# Patient Record
Sex: FEMALE | Race: WHITE | NOT HISPANIC OR LATINO | Employment: UNEMPLOYED | URBAN - METROPOLITAN AREA
[De-identification: names, ages, dates, MRNs, and addresses within clinical notes are randomized per-mention and may not be internally consistent; named-entity substitution may affect disease eponyms.]

---

## 2019-08-29 ENCOUNTER — CONSULT (OUTPATIENT)
Dept: SLEEP MEDICINE | Facility: HOSPITAL | Age: 24
End: 2019-08-29

## 2019-08-29 VITALS
BODY MASS INDEX: 20.88 KG/M2 | OXYGEN SATURATION: 96 % | HEART RATE: 70 BPM | SYSTOLIC BLOOD PRESSURE: 108 MMHG | WEIGHT: 103.6 LBS | HEIGHT: 59 IN | DIASTOLIC BLOOD PRESSURE: 63 MMHG

## 2019-08-29 DIAGNOSIS — G47.419 NARCOLEPSY WITHOUT CATAPLEXY: ICD-10-CM

## 2019-08-29 DIAGNOSIS — G47.63 SLEEP-RELATED BRUXISM: ICD-10-CM

## 2019-08-29 DIAGNOSIS — G47.10 HYPERSOMNIA: Primary | ICD-10-CM

## 2019-08-29 PROCEDURE — 99204 OFFICE O/P NEW MOD 45 MIN: CPT | Performed by: INTERNAL MEDICINE

## 2019-08-29 RX ORDER — ESCITALOPRAM OXALATE 10 MG/1
10 TABLET ORAL DAILY
Refills: 5 | COMMUNITY
Start: 2019-06-26 | End: 2021-05-21 | Stop reason: DRUGHIGH

## 2019-08-29 NOTE — PROGRESS NOTES
Elena Morales is a 23 y.o. female is being seen for consultation today at the request of Guicho Ram MD for the evaluation of excessive sleeping and daytime sleepiness.    History of Present Illness  Patient complains of always sleeping a lot.  She says she has to get at least 10 hours of sleep each night and feel rested.  She has been sleepy according to the patient and her mother since she was a child.  She was sleeping a lot before middle school.  She took naps after she got home from school.  She is also been noted to have significant bruxism and wears a bite guard all the time.  She apparently had a medical work-up that so far has been negative.  She says she is sleepy during the day.  She no longer naps regularly since she does work some of the time.  She denies any problems while driving.  She has not been noted to have snoring.  She has had the bruxism noted.    She denies awakening with a dry mouth or sore throat.  She denies waking gasping coughing choking.  She denies having trouble breathing through nose or breaking her nose.  She denies morning headaches.  She has a history of reflux but is not on medications.  She denies hypnagogic hallucinations or sleep paralysis.  She denies any family history of excessive sleepiness.  She denies any history of cataplexy.  She says she does have dreams during her naps.  She feels more refreshed after taking a nap.  Her brother apparently has a history of sleeping poorly at night.  She denies any history of kicking or jerking her legs.  She denies any history of chronic pain.  She states her weight is fairly stable.    She has a history of social anxiety disorder and is been on Lexapro.  She apparently was sleepy before she Lexapro was started.  She went off it for most of last summer and did not feel any less sleepy.    She arises about 11 AM and has breakfast gets cleaned up.  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  She has lunch about 1  PM.  She then works around the house or her job until dinnertime which is about 7 PM.  She then watches TV until she goes to bed about 11 PM.  Will take about half hour to go to sleep.  She awakens once during the night.  She thinks she gets 10 to 12 hours of sleep.  She says she feels better but sometimes is still somewhat tired.  She denies any history of hypertension diabetes coronary artery disease.  She does have history of anxiety.  She has environmental allergies to cats.      Current Outpatient Medications:   •  escitalopram (LEXAPRO) 10 MG tablet, Take 10 mg by mouth Daily., Disp: , Rfl: 5    Social History    Tobacco Use      Smoking status: Never Smoker      Smokeless tobacco: Never Used       Social History     Substance and Sexual Activity   Alcohol Use No   • Frequency: Never       Caffeine: 1 serving coffee and 1 serving of tea each day    Past Medical History:   Diagnosis Date   • Mental disorder     ANXIETY       Past Surgical History:   Procedure Laterality Date   • WISDOM TOOTH EXTRACTION  2015       Family History   Problem Relation Age of Onset   • Hypertension Mother    • Hypertension Maternal Grandmother    • Hypertension Maternal Grandfather        The following portions of the patient's history were reviewed and updated as appropriate: allergies, current medications, past family history, past medical history, past social history, past surgical history and problem list.    Review of Systems   Constitutional: Negative.    HENT: Negative.    Eyes: Negative.    Respiratory: Negative.    Cardiovascular: Negative.    Gastrointestinal:        She has frequent heartburn   Endocrine: Negative.    Genitourinary: Negative.    Musculoskeletal: Negative.    Skin: Negative.    Allergic/Immunologic: Positive for environmental allergies.   Neurological: Negative.    Hematological: Negative.    Psychiatric/Behavioral: The patient is nervous/anxious.    Jackson score is 3/24    Objective     /63    "Pulse 70   Ht 149.9 cm (59\")   Wt 47 kg (103 lb 9.6 oz)   SpO2 96%   BMI 20.92 kg/m²      Physical Exam   Constitutional: She is oriented to person, place, and time. She appears well-developed and well-nourished.   She is slender.   HENT:   Head: Normocephalic and atraumatic.   She has nasal airway narrowing Mallampati class I anatomy.   Eyes: EOM are normal. Pupils are equal, round, and reactive to light.   Neck: Normal range of motion. Neck supple.   Cardiovascular: Normal rate, regular rhythm and normal heart sounds.   Pulmonary/Chest: Effort normal and breath sounds normal.   Abdominal: Soft. Bowel sounds are normal.   Musculoskeletal: Normal range of motion. She exhibits no edema.   Neurological: She is alert and oriented to person, place, and time.   Skin: Skin is warm and dry.   Psychiatric: She has a normal mood and affect. Her behavior is normal.         Assessment/Plan   Lauren was seen today for sleeping problem.    Diagnoses and all orders for this visit:    Hypersomnia  -     Polysomnography 4 or more parameters; Future  -     Urine Drug Screen - Urine, Clean Catch; Future  -     Multiple sleep latency test without CPAP; Future    Sleep-related bruxism  -     Polysomnography 4 or more parameters; Future    Narcolepsy without cataplexy  -     Polysomnography 4 or more parameters; Future  -     Urine Drug Screen - Urine, Clean Catch; Future  -     Multiple sleep latency test without CPAP; Future    Patient presents with complaints of requiring excessive amounts of sleeping and then being sleepy during the day.  She has had sleepiness apparently since childhood.  She really does not give a history of much in the way of snoring.  She does have a history of bruxism that could be interrupting her sleep but that apparently is under better control with her oral appliance.  I think certainly narcolepsy would be a consideration.  I have discussed with the patient and her mother of the evaluation which would " include an overnight polysomnogram as well as multiple sleep latency test.  We have discussed possible therapies for narcolepsy.  They wish to work on securing insurance coverage before scheduling the studies.  I think that is reasonable.  The patient is not having sleep attacks or problems while driving.  We will plan to see her back after her study.  She is to practice good sleep hygiene.  She is to avoid alcohol and sedatives close to bedtime.  She is to maintain ideal body weight.         John John MD [facility]  Sleep Medicine  Pulmonary and Critical Care Medicine

## 2019-08-29 NOTE — PATIENT INSTRUCTIONS
Hypersomnia  Hypersomnia is a condition in which a person feels very tired during the day even though he or she gets plenty of sleep at night. A person with this condition may take naps during the day and may find it very difficult to wake up from sleep. Hypersomnia may affect a person's ability to think, concentrate, drive, or remember things.  What are the causes?  The cause of this condition may not be known. Possible causes include:  · Certain medicines.  · Sleep disorders, such as narcolepsy and sleep apnea.  · Injury to the head, brain, or spinal cord.  · Drug or alcohol use.  · Gastroesophageal reflux disease (GERD).  · Tumors.  · Certain medical conditions, such as depression, diabetes, or an underactive thyroid gland (hypothyroidism).  What are the signs or symptoms?  The main symptoms of hypersomnia include:  · Feeling very tired throughout the day, regardless of how much sleep you got the night before.  · Having trouble waking up. Others may find it difficult to wake you up when you are sleeping.  · Sleeping for longer and longer periods at a time.  · Taking naps throughout the day.  Other symptoms may include:  · Feeling restless, anxious, or annoyed.  · Lacking energy.  · Having trouble with:  ? Remembering.  ? Speaking.  ? Thinking.  · Loss of appetite.  · Seeing, hearing, tasting, smelling, or feeling things that are not real (hallucinations).  How is this diagnosed?  This condition may be diagnosed based on:  · Your symptoms and medical history.  · Your sleeping habits. Your health care provider may ask you to write down your sleeping habits in a daily sleep log, along with any symptoms you have.  · A series of tests that are done while you sleep (sleep study or polysomnogram).  · A test that measures how quickly you can fall asleep during the day (daytime nap study or multiple sleep latency test).  How is this treated?  Treatment can help you manage your condition. Treatment may  include:  · Following a regular sleep routine.  · Lifestyle changes, such as changing your eating habits, getting regular exercise, and avoiding alcohol or caffeinated beverages.  · Taking medicines to make you more alert (stimulants) during the day.  · Treating any underlying medical causes of hypersomnia.  Follow these instructions at home:  Sleep routine    · Schedule the same bedtime and wake-up time each day.  · Practice a relaxing bedtime routine. This may include reading, meditation, deep breathing, or taking a warm bath before going to sleep.  · Get regular exercise each day. Avoid strenuous exercise in the evening hours.  · Keep your sleep environment at a cooler temperature, darkened, and quiet.  · Sleep with pillows and a mattress that are comfortable and supportive.  · Schedule short 20-minute naps for when you feel sleepiest during the day.  · Talk with your employer or teachers about your hypersomnia. If possible, adjust your schedule so that:  ? You have a regular daytime work schedule.  ? You can take a scheduled nap during the day.  ? You do not have to work or be active at night.  · Do not eat a heavy meal for a few hours before bedtime. Eat your meals at about the same times every day.  · Avoid drinking alcohol or caffeinated beverages.  Safety    · Do not drive or use heavy machinery if you are sleepy. Ask your health care provider if it is safe for you to drive.  · Wear a life jacket when swimming or spending time near water.  General instructions  · Take supplements and over-the-counter and prescription medicines only as told by your health care provider.  · Keep a sleep log that will help your doctor manage your condition. This may include information about:  ? What time you go to bed each night.  ? How often you wake up at night.  ? How many hours you sleep at night.  ? How often and for how long you nap during the day.  ? Any observations from others, such as leg movements during sleep,  sleep walking, or snoring.  · Keep all follow-up visits as told by your health care provider. This is important.  Contact a health care provider if:  · You have new symptoms.  · Your symptoms get worse.  Get help right away if:  · You have serious thoughts about hurting yourself or someone else.  If you ever feel like you may hurt yourself or others, or have thoughts about taking your own life, get help right away. You can go to your nearest emergency department or call:  · Your local emergency services (911 in the [place]  · A suicide crisis helpline, such as the National Suicide Prevention Lifeline at 1-158.797.7438. This is open 24 hours a day.  Summary  · Hypersomnia refers to a condition in which you feel very tired during the day even though you get plenty of sleep at night.  · A person with this condition may take naps during the day and may find it very difficult to wake up from sleep.  · Hypersomnia may affect a person's ability to think, concentrate, drive, or remember things.  · Treatment, such as following a regular sleep routine and making some lifestyle changes, can help you manage your condition.  This information is not intended to replace advice given to you by your health care provider. Make sure you discuss any questions you have with your health care provider.  Document Released: 12/08/2003 Document Revised: 12/20/2018 Document Reviewed: 12/20/2018  [employer] Interactive Patient Education © 2019 [employer]  Narcolepsy  Narcolepsy is a neurological disorder that causes you to fall asleep suddenly, and without control, during the daytime (sleep attacks). Narcolepsy is a lifelong (chronic) disorder.  Normally, sleep follows a regular cycle over the course of the night. After about 90 minutes of light sleep, your sleep should become deeper. When your sleep becomes deeper, your body moves less and you start dreaming. This type of deep sleep is called rapid eye movement (REM) sleep. When you have  narcolepsy, your REM sleep is not well-regulated. This disrupts your sleep cycle, which causes daytime sleepiness.  What are the causes?  The cause of narcolepsy is not fully understood, but it may be related to:  · Low levels of hypocretin, a chemical (neurotransmitter) in the brain that controls sleep and wake cycles. Hypocretin imbalance may be caused by:  ? Abnormal genes that are passed from parent to child (inherited).  ? The body’s defense system (immune system) attacking hypocretin brain cells (autoimmune disease).  · Infection, tumor, or injury in the area of the brain that controls sleep.  · Exposure to poisons (toxins), such as heavy metals, pesticides, and secondhand smoke.  What are the signs or symptoms?  Symptoms of this condition include:  · Excessive daytime sleepiness. This is the most common symptom and is usually the first symptom you will notice. This may affect your performance at work or school.  · Sleep attacks. This means falling asleep suddenly and without control. You may fall asleep in the middle of an activity, especially low-energy activities like reading or watching TV.  · Feeling like you cannot think clearly.  · Trouble focusing or remembering things.  · Feeling depressed.  · Sudden muscle weakness (cataplexy). When this occurs, your speech may become slurred, or your knees may buckle. Cataplexy is usually triggered by surprise, anger, fear, or laughter.  · Loss of the ability to speak or move (sleep paralysis). This may occur just as you start to fall asleep or wake up. You will be aware of the paralysis. It usually lasts for just a few seconds or minutes.  · Seeing, hearing, tasting, smelling, or feeling things that are not real (hallucinations). Hallucinations may occur with sleep paralysis. They can happen when you are falling asleep, waking up, or dozing.  · Trouble staying asleep at night (insomnia).  · Restless sleep.  How is this diagnosed?  This condition may be diagnosed  based on:  · A physical exam to rule out any other problems that may be causing your symptoms. You may be asked to write down your sleeping patterns for several weeks in a sleep diary. This will help your health care provider make a diagnosis.  · Sleep studies that measure how well your REM sleep is regulated. These tests also measure your heart rate, breathing, movement, and brain waves. These tests include:  ? An overnight sleep study (polysomnogram).  ? A daytime sleep study that is done while you take several naps during the day (multiple sleep latency test, MSLT). This test measures how quickly you fall asleep and how quickly you enter REM sleep.  · Removal of spinal fluid to measure hypocretin levels.  How is this treated?  There is no cure for this condition, but treatment can help relieve symptoms. Treatment may include:  · Lifestyle and sleeping strategies to help you cope with the condition, such as:  ? Exercising regularly.  ? Maintaining a regular sleep schedule.  ? Avoiding caffeine and large meals before bed.  · Medicines. These may include:  ? Medicines that help keep you awake and alert (stimulants) to fight daytime sleepiness.  ? Medicines that treat depression (antidepressants). These may be used to treat cataplexy.  ? Sodium oxybate. This is a strong medicine to help you relax (sedative) that you may take at night. It can help control daytime sleepiness and cataplexy.  Follow these instructions at home:  Sleeping habits    · Get about 8 hours of sleep every night.  · Go to sleep and get up at about the same time every day.  · Keep your bedroom dark, quiet, and comfortable.  · When you feel very tired, take short naps. Schedule naps so that you take them at about the same time every day.  · Tell your employer or teachers that you have narcolepsy. You may be able to adjust your schedule to include time for naps.  · Before bedtime:  ? Avoid bright lights and screens.  ? Relax. Try activities like  reading or taking a warm bath.  Activity  · Get at least 20 minutes of exercise every day. This will help you sleep better at night and reduce daytime sleepiness.  · Avoid exercising within 3 hours of bedtime.  · If you are sleepy, do not drive or use heavy machinery.  · If possible, take a nap before driving.  · Do not swim or go out on the water without a life jacket.  Eating and drinking  · Do not drink alcohol or caffeinated beverages within 4-5 hours of bedtime.  · Do not eat a lot of food before bedtime. Eat meals at about the same times every day.  General instructions    · Take over-the-counter and prescription medicines only as told by your health care provider.  · If directed, keep a sleep diary.  · Tell your employer or teachers that you have narcolepsy. You may be able to adjust your schedule to include time for naps.  · Do not use any products that contain nicotine or tobacco, such as cigarettes and e-cigarettes. If you need help quitting, ask your health care provider.  · Keep all follow-up visits as told by your health care provider. This is important.  Contact a health care provider if:  · Your symptoms are not getting better.  · You have increasingly high blood pressure (hypertension).  · You have changes in your heart rhythm.  · You are having a hard time determining what is real and what is not (psychosis).  Get help right away if:  · You hurt yourself during a sleep attack or an attack of cataplexy.  · You have chest pain.  · You have trouble breathing.  This information is not intended to replace advice given to you by your health care provider. Make sure you discuss any questions you have with your health care provider.  Document Released: 12/08/2003 Document Revised: 12/11/2017 Document Reviewed: 12/11/2017  [employer] Interactive Patient Education © 2019 [employer]

## 2020-01-17 ENCOUNTER — HOSPITAL ENCOUNTER (OUTPATIENT)
Dept: SLEEP MEDICINE | Facility: HOSPITAL | Age: 25
Discharge: HOME | End: 2020-01-17
Admitting: INTERNAL MEDICINE

## 2020-01-17 VITALS
HEART RATE: 79 BPM | OXYGEN SATURATION: 94 % | SYSTOLIC BLOOD PRESSURE: 121 MMHG | DIASTOLIC BLOOD PRESSURE: 60 MMHG | WEIGHT: 105.16 LBS | HEIGHT: 60 IN | BODY MASS INDEX: 20.65 KG/M2

## 2020-01-17 DIAGNOSIS — G47.10 HYPERSOMNIA: ICD-10-CM

## 2020-01-17 DIAGNOSIS — G47.63 SLEEP-RELATED BRUXISM: ICD-10-CM

## 2020-01-17 DIAGNOSIS — G47.419 NARCOLEPSY WITHOUT CATAPLEXY: ICD-10-CM

## 2020-01-17 PROCEDURE — 95810 POLYSOM 6/> YRS 4/> PARAM: CPT | Performed by: INTERNAL MEDICINE

## 2020-01-17 PROCEDURE — 95810 POLYSOM 6/> YRS 4/> PARAM: CPT

## 2020-01-18 ENCOUNTER — APPOINTMENT (OUTPATIENT)
Dept: SLEEP MEDICINE | Facility: HOSPITAL | Age: 25
End: 2020-01-18

## 2020-01-20 ENCOUNTER — TELEPHONE (OUTPATIENT)
Dept: SLEEP MEDICINE | Facility: HOSPITAL | Age: 25
End: 2020-01-20

## 2020-01-20 NOTE — TELEPHONE ENCOUNTER
CALLED PATIENT TO SCHEDULE FU VISIT TO DISCUSS STUDY RESULTS. REACHED VM AND LEFT MESSAGE REQUESTING RETURN CALL

## 2020-01-21 ENCOUNTER — OFFICE VISIT (OUTPATIENT)
Dept: SLEEP MEDICINE | Facility: HOSPITAL | Age: 25
End: 2020-01-21

## 2020-01-21 VITALS
HEIGHT: 60 IN | DIASTOLIC BLOOD PRESSURE: 64 MMHG | BODY MASS INDEX: 20.81 KG/M2 | WEIGHT: 106 LBS | OXYGEN SATURATION: 98 % | SYSTOLIC BLOOD PRESSURE: 109 MMHG | HEART RATE: 91 BPM

## 2020-01-21 DIAGNOSIS — G47.63 SLEEP-RELATED BRUXISM: ICD-10-CM

## 2020-01-21 DIAGNOSIS — G47.33 OSA (OBSTRUCTIVE SLEEP APNEA): Primary | ICD-10-CM

## 2020-01-21 PROCEDURE — 99214 OFFICE O/P EST MOD 30 MIN: CPT | Performed by: INTERNAL MEDICINE

## 2020-01-21 NOTE — PROGRESS NOTES
"Elena Morales is a 24 y.o. female is here today for follow-up.  She is seen for follow-up of excessive daytime sleepiness and bruxism.  Her primary care physician is Dr. Ram.    History of Present Illness  Patient was last seen August 29, 2019.  She says she thinks she has been sleeping about the same since August.  She is very sleepy during the day.  Says she usually falls asleep fairly well and is sleeping 10 to 12 hours but is still very sleepy during the day.  She presented on January 17 for a polysomnogram.  [facility]  Past Medical History:   Diagnosis Date   • Mental disorder     ANXIETY       Past Surgical History:   Procedure Laterality Date   • WISDOM TOOTH EXTRACTION  2015           Current Outpatient Medications:   •  escitalopram (LEXAPRO) 10 MG tablet, Take 10 mg by mouth Daily., Disp: , Rfl: 5    Allergies   Allergen Reactions   • Cephalexin Swelling       The following portions of the patient's history were reviewed and updated as appropriate: allergies, current medications and problem list.    Review of Systems   Constitutional: Negative.    HENT: Negative.    Eyes: Negative.    Respiratory: Negative.    Cardiovascular: Negative.    Gastrointestinal: Negative.    Endocrine: Negative.    Genitourinary: Negative.    Musculoskeletal: Negative.    Skin: Negative.    Allergic/Immunologic: Negative.    Neurological: Negative.    Hematological: Negative.    Psychiatric/Behavioral: The patient is nervous/anxious.        Objective     /64   Pulse 91   Ht 151.1 cm (59.5\")   Wt 48.1 kg (106 lb)   SpO2 98%   BMI 21.05 kg/m²     Physical Exam   Constitutional: She is oriented to person, place, and time. She appears well-developed and well-nourished.   HENT:   Head: Normocephalic and atraumatic.   She has Mallampati class I anatomy.   Eyes: Pupils are equal, round, and reactive to light. EOM are normal.   Neck: Normal range of motion. Neck supple.   Cardiovascular: " Normal rate, regular rhythm and normal heart sounds.   Pulmonary/Chest: Effort normal and breath sounds normal.   Abdominal: Soft. Bowel sounds are normal.   Musculoskeletal: Normal range of motion. She exhibits no edema.   Neurological: She is alert and oriented to person, place, and time.   Skin: Skin is warm and dry.   Psychiatric: She has a normal mood and affect. Her behavior is normal.   Polysomnogram on January 17 showed a sleep efficiency of only 38.6%.  She spent only 5.7% of her total sleep time and REM sleep.  She had an AHI of 8.7 consistent with mild obstructive sleep apnea.  She did not have supine sleep.      Assessment/Plan   Lauren was seen today for follow-up.    Diagnoses and all orders for this visit:    YVON (obstructive sleep apnea)  -     Ambulatory Referral to Dentistry  -      Mandibular Advancement Device (custom fabricated)  -     Ambulatory Referral to ENT (Otolaryngology)    Sleep-related bruxism  -     Ambulatory Referral to Dentistry  -      Mandibular Advancement Device (custom fabricated)    Patient does have mild obstructive sleep apnea.  We have discussed possible therapies including CPAP, weight control, oral appliance, and surgery.  She thinks it she has some problems breathing through her nose at times and would like to have a referral to ENT.  She also however would like to consider an oral appliance.  We will order this as well.  She is to follow-up with her dentist.  We will plan to see her back in 6 months.  She is encouraged to maintain ideal body weight.  She is encouraged to avoid alcohol and sedatives close to bedtime.  She is encouraged to practice lateral position sleep.             John John MD [facility]  Sleep Medicine  Pulmonary and Critical Care Medicine      01/21/20  1:22 PM

## 2020-03-15 ENCOUNTER — OFFICE VISIT (OUTPATIENT)
Dept: RETAIL CLINIC | Facility: CLINIC | Age: 25
End: 2020-03-15

## 2020-03-15 VITALS
TEMPERATURE: 98.1 F | BODY MASS INDEX: 20.85 KG/M2 | HEART RATE: 85 BPM | WEIGHT: 105 LBS | RESPIRATION RATE: 20 BRPM | OXYGEN SATURATION: 99 %

## 2020-03-15 DIAGNOSIS — J03.90 TONSILLITIS: ICD-10-CM

## 2020-03-15 DIAGNOSIS — J02.9 ACUTE PHARYNGITIS, UNSPECIFIED ETIOLOGY: ICD-10-CM

## 2020-03-15 LAB
EXPIRATION DATE: NORMAL
INTERNAL CONTROL: NORMAL
Lab: NORMAL
S PYO AG THROAT QL: NEGATIVE

## 2020-03-15 PROCEDURE — 99213 OFFICE O/P EST LOW 20 MIN: CPT | Performed by: NURSE PRACTITIONER

## 2020-03-15 PROCEDURE — 87880 STREP A ASSAY W/OPTIC: CPT | Performed by: NURSE PRACTITIONER

## 2020-03-15 RX ORDER — AMOXICILLIN 875 MG/1
875 TABLET, COATED ORAL 2 TIMES DAILY
Qty: 20 TABLET | Refills: 0 | Status: SHIPPED | OUTPATIENT
Start: 2020-03-15 | End: 2020-03-25

## 2020-03-15 NOTE — PROGRESS NOTES
Sore Throat      Subjective   Lauren Morales is a 24 y.o. female.     Sore Throat    This is a new problem. The current episode started yesterday. The problem has been unchanged. Neither side of throat is experiencing more pain than the other. There has been no fever. The pain is moderate. Associated symptoms include swollen glands and trouble swallowing (pain). Pertinent negatives include no abdominal pain, congestion, coughing, diarrhea, drooling, ear discharge, ear pain, headaches, hoarse voice, plugged ear sensation, neck pain, shortness of breath, stridor or vomiting. She has had no exposure to strep or mono. She has tried gargles for the symptoms. The treatment provided no relief.        Patient Active Problem List   Diagnosis   • Hypersomnia   • Sleep-related bruxism   • YVON (obstructive sleep apnea)       Allergies   Allergen Reactions   • Cephalexin Swelling        Current Outpatient Medications on File Prior to Visit   Medication Sig Dispense Refill   • escitalopram (LEXAPRO) 10 MG tablet Take 10 mg by mouth Daily.  5     No current facility-administered medications on file prior to visit.        Past Medical History:   Diagnosis Date   • Mental disorder     ANXIETY       Past Surgical History:   Procedure Laterality Date   • WISDOM TOOTH EXTRACTION  2015       Family History   Problem Relation Age of Onset   • Hypertension Mother    • Hypertension Maternal Grandmother    • Hypertension Maternal Grandfather        Social History     Socioeconomic History   • Marital status: Single     Spouse name: Not on file   • Number of children: Not on file   • Years of education: Not on file   • Highest education level: Not on file   Tobacco Use   • Smoking status: Never Smoker   • Smokeless tobacco: Never Used   Substance and Sexual Activity   • Alcohol use: No     Frequency: Never   • Drug use: No   • Sexual activity: Defer       Travel:  No recent travel within the last 21 days outside the [place] Denies recent travel  to one of the following [place]  Countries:  [place], [place], [place], or [place].  Denies contact with anyone who has traveled to one of the following [place]  Countries: [place], [place], [place], or [place] within the last 21 days and is known or suspected to have Ebola.  Denies having had any contact with the human remains, blood or any bodily fluids of someone who is known or suspected to have Ebola within the last 21 days.     OB History    None         Review of Systems   Constitutional: Positive for appetite change. Negative for activity change, chills, fatigue and fever.   HENT: Positive for sore throat and trouble swallowing (pain). Negative for congestion, drooling, ear discharge, ear pain, hoarse voice, postnasal drip, rhinorrhea, sinus pressure, sinus pain, sneezing and voice change.    Respiratory: Negative for cough, chest tightness, shortness of breath and stridor.    Cardiovascular: Negative for chest pain.   Gastrointestinal: Negative for abdominal pain, diarrhea, nausea and vomiting.   Musculoskeletal: Negative for myalgias and neck pain.   Skin: Negative for rash.   Neurological: Negative for headaches.   All other systems reviewed and are negative.      Pulse 85   Temp 98.1 °F (36.7 °C) (Oral)   Resp 20   Wt 47.6 kg (105 lb)   LMP 03/01/2020 (Within Days)   SpO2 99%   Breastfeeding No   BMI 20.85 kg/m²     Objective   Physical Exam   Constitutional: She is oriented to person, place, and time. She appears well-developed and well-nourished. No distress.   HENT:   Head: Normocephalic and atraumatic.   Right Ear: Hearing, external ear and ear canal normal. Tympanic membrane is injected.   Left Ear: Hearing, external ear and ear canal normal. Tympanic membrane is injected.   Nose: Nose normal. No mucosal edema or rhinorrhea.   Mouth/Throat: Uvula is midline and mucous membranes are normal. Posterior oropharyngeal erythema present. No oropharyngeal exudate, posterior oropharyngeal  edema or tonsillar abscesses. Tonsils are 2+ on the right. Tonsils are 2+ on the left. Tonsillar exudate.   Eyes: Pupils are equal, round, and reactive to light. Conjunctivae and EOM are normal. Right eye exhibits no discharge. Left eye exhibits no discharge. No scleral icterus.   Neck: Normal range of motion. Neck supple.   Cardiovascular: Normal rate, regular rhythm and normal heart sounds.   Pulmonary/Chest: Effort normal and breath sounds normal. No respiratory distress. She has no wheezes. She has no rales.   Musculoskeletal: Normal range of motion.   Lymphadenopathy:     She has cervical adenopathy.   Neurological: She is alert and oriented to person, place, and time.   Skin: Skin is warm and dry. No rash noted. She is not diaphoretic.   Psychiatric: She has a normal mood and affect. Her behavior is normal.   Vitals reviewed.      Assessment/Plan   Lauren was seen today for sore throat.    Diagnoses and all orders for this visit:    Tonsillitis  -     amoxicillin (AMOXIL) 875 MG tablet; Take 1 tablet by mouth 2 (Two) Times a Day for 10 days.    Acute pharyngitis, unspecified etiology  -     POC Rapid Strep A    Plan of care discussed: rest, increase fluids, alternate Tylenol and Motrin for pain; warm salt water gargles; complete antibiotic entirely; change toothbrush in 48 hours. Follow up with PCP for no improvement in 2-3 days or sooner for new or worsening symptoms.     Results for orders placed or performed in visit on 03/15/20   POC Rapid Strep A   Result Value Ref Range    Rapid Strep A Screen Negative Negative, VALID, INVALID, Not Performed    Internal Control Passed Passed    Lot Number [id number]     Expiration Date 04/30/21        Return if symptoms worsen or fail to improve.

## 2020-03-15 NOTE — PATIENT INSTRUCTIONS
Tonsillitis    Tonsillitis is an infection of the throat. This infection causes the tonsils to become red, tender, and swollen. Tonsils are tissues in the back of your throat. If bacteria caused your infection, antibiotic medicine will be given to you. Sometimes, symptoms of this infection can be treated with the use of medicines that lessen swelling (steroids). If your tonsillitis is very bad (severe) and happens often, you may need to get your tonsils removed (tonsillectomy).  Follow these instructions at home:  Medicines  · Take over-the-counter and prescription medicines only as told by your doctor.  · If you were prescribed an antibiotic, take it as told by your doctor. Do not stop taking the antibiotic even if you start to feel better.  Eating and drinking  · Drink enough fluid to keep your pee (urine) clear or pale yellow.  · While your throat is sore, eat soft or liquid foods like:  ? Soup.  ? Sherbert.  ? Instant breakfast drinks.  · Drink warm fluids.  · Eat frozen ice pops.  General instructions  · Rest as much as possible and get plenty of sleep.  · Gargle with a salt-water mixture 3-4 times a day or as needed. To make a salt-water mixture, completely dissolve ½-1 tsp of salt in 1 cup of warm water.  · Wash your hands often with soap and water. If there is no soap and water, use hand .  · Do not share cups, bottles, or other utensils until your symptoms are gone.  · Do not smoke. If you need help quitting, ask your doctor.  · Keep all follow-up visits as told by your doctor. This is important.  Contact a doctor if:  · You have large, tender lumps in your neck.  · You have a fever that does not go away after 2-3 days.  · You have a rash.  · You cough up green, yellow-brown, or bloody fluid.  · You cannot swallow liquids or food for 24 hours.  · Only one of your tonsils is swollen.  Get help right away if:  · You have any new symptoms such as:  ? Vomiting.  ? Very bad headache.  ? Stiff  neck.  ? Chest pain.  ? Trouble breathing or swallowing.  · You have very bad throat pain and you also have drooling or voice changes.  · You have very bad pain that is not helped by medicine.  · You cannot fully open your mouth.  · You have redness, swelling, or severe pain anywhere in your neck.  Summary  · Tonsillitis causes your tonsils to be red, tender, and swollen.  · While your throat is sore, eat soft or liquid foods.  · Gargle with a salt-water mixture 3-4 times a day or as needed.  · Do not share cups, bottles, or other utensils until your symptoms are gone.  This information is not intended to replace advice given to you by your health care provider. Make sure you discuss any questions you have with your health care provider.  Document Released: 06/05/2009 Document Revised: 01/23/2018 Document Reviewed: 01/23/2018  [employer] Interactive Patient Education © 2020 [employer]

## 2020-06-09 ENCOUNTER — APPOINTMENT (OUTPATIENT)
Dept: PREADMISSION TESTING | Facility: HOSPITAL | Age: 25
End: 2020-06-09

## 2020-06-09 LAB
REF LAB TEST METHOD: NORMAL
SARS-COV-2 RNA RESP QL NAA+PROBE: NOT DETECTED

## 2020-06-09 PROCEDURE — U0002 COVID-19 LAB TEST NON-CDC: HCPCS

## 2020-06-09 PROCEDURE — C9803 HOPD COVID-19 SPEC COLLECT: HCPCS

## 2020-06-09 PROCEDURE — U0004 COV-19 TEST NON-CDC HGH THRU: HCPCS

## 2020-07-15 ENCOUNTER — APPOINTMENT (OUTPATIENT)
Dept: PREADMISSION TESTING | Facility: HOSPITAL | Age: 25
End: 2020-07-15

## 2020-07-15 LAB — SARS-COV-2 RNA RESP QL NAA+PROBE: NOT DETECTED

## 2020-07-15 PROCEDURE — C9803 HOPD COVID-19 SPEC COLLECT: HCPCS

## 2020-07-15 PROCEDURE — 87635 SARS-COV-2 COVID-19 AMP PRB: CPT | Performed by: OTOLARYNGOLOGY

## 2020-07-16 ENCOUNTER — LAB REQUISITION (OUTPATIENT)
Dept: LAB | Facility: HOSPITAL | Age: 25
End: 2020-07-16

## 2020-07-16 DIAGNOSIS — J35.1 HYPERTROPHY OF TONSILS: ICD-10-CM

## 2020-07-16 PROCEDURE — 88304 TISSUE EXAM BY PATHOLOGIST: CPT | Performed by: OTOLARYNGOLOGY

## 2020-07-17 LAB
CYTO UR: NORMAL
LAB AP CASE REPORT: NORMAL
LAB AP CLINICAL INFORMATION: NORMAL
PATH REPORT.FINAL DX SPEC: NORMAL
PATH REPORT.GROSS SPEC: NORMAL

## 2021-04-28 ENCOUNTER — TELEPHONE (OUTPATIENT)
Dept: SLEEP MEDICINE | Facility: HOSPITAL | Age: 26
End: 2021-04-28

## 2021-04-28 NOTE — TELEPHONE ENCOUNTER
Patient called says tired the sleep guard machine she said it holds your jaw forward but it din;t work pt requesting cpap prescription

## 2021-05-21 ENCOUNTER — OFFICE VISIT (OUTPATIENT)
Dept: SLEEP MEDICINE | Facility: HOSPITAL | Age: 26
End: 2021-05-21

## 2021-05-21 VITALS
HEART RATE: 68 BPM | DIASTOLIC BLOOD PRESSURE: 54 MMHG | HEIGHT: 64 IN | WEIGHT: 115.4 LBS | OXYGEN SATURATION: 95 % | BODY MASS INDEX: 19.7 KG/M2 | SYSTOLIC BLOOD PRESSURE: 90 MMHG

## 2021-05-21 DIAGNOSIS — G47.33 OBSTRUCTIVE SLEEP APNEA, ADULT: ICD-10-CM

## 2021-05-21 DIAGNOSIS — R06.83 SNORING: Primary | ICD-10-CM

## 2021-05-21 PROCEDURE — 99213 OFFICE O/P EST LOW 20 MIN: CPT | Performed by: INTERNAL MEDICINE

## 2021-05-21 RX ORDER — MINOCYCLINE HYDROCHLORIDE 100 MG/1
CAPSULE ORAL
COMMUNITY
End: 2022-11-17

## 2021-05-21 RX ORDER — CLINDAMYCIN AND BENZOYL PEROXIDE 10; 50 MG/G; MG/G
GEL TOPICAL
COMMUNITY
End: 2022-11-17

## 2021-05-21 RX ORDER — ESCITALOPRAM OXALATE 20 MG/1
TABLET ORAL
COMMUNITY
End: 2022-11-17

## 2021-05-21 RX ORDER — TRETINOIN 0.5 MG/G
CREAM TOPICAL
COMMUNITY
Start: 2020-06-03 | End: 2023-01-30 | Stop reason: SDUPTHER

## 2021-05-25 NOTE — PROGRESS NOTES
"Elena Morales is a 25 y.o. female is here today for follow-up.  She is seen for follow-up of obstructive sleep apnea.  Her primary care physician is Dr. Ram.  She is seen in person in the sleep clinic.    History of Present Illness  Patient was last seen in this clinic on January 21, 2020.  She had mild obstructive sleep apnea with an AHI of 8.7 on her previous study.  She was referred to get an oral appliance.  She was seen by ENT and had her tonsils removed and also got an oral appliance but she is still having nonrestful sleep and \"heavy breathing\" noted.  She wishes to consider CPAP.  She does not feel rested on arising in the morning.  She has a morning headache 3 days/week.  Past Medical History:   Diagnosis Date   • Mental disorder     ANXIETY       Past Surgical History:   Procedure Laterality Date   • WISDOM TOOTH EXTRACTION  2015           Current Outpatient Medications:   •  tretinoin (RETIN-A) 0.05 % cream, tretinoin 0.05 % topical cream  APPLY TO THE AFFECTED AREA(S) BY TOPICAL ROUTE ONCE DAILY AT BEDTIME, Disp: , Rfl:   •  clindamycin-benzoyl peroxide (BENZACLIN) 1-5 % gel, clindamycin 1 %-benzoyl peroxide 5 % topical gel  APPLY   TOPICALLY ONCE DAILY, Disp: , Rfl:   •  escitalopram (LEXAPRO) 20 MG tablet, escitalopram 20 mg tablet  Take 1 tablet every day by oral route for 90 days., Disp: , Rfl:   •  minocycline (MINOCIN,DYNACIN) 100 MG capsule, minocycline 100 mg capsule  take 1 capsule BID x 7 days then 1 capsule daily x 3 weeks, Disp: , Rfl:   •  sulfacetamide sodium-sulfur (AVAR,PLEXION) 10-5 % topical emulsion, sulfacetamide sodium-sulfur 10 %-5 % (w/w) topical cleanser  WASH THE AFFECTED AREA(S) BY TOPICAL ROUTE ONCE DAILY, Disp: , Rfl:     Allergies   Allergen Reactions   • Cephalexin Swelling       The following portions of the patient's history were reviewed and updated as appropriate: allergies, current medications and problem list.    Review of Systems   Constitutional: " "Negative.    HENT: Negative.    Eyes: Negative.    Respiratory: Negative.    Cardiovascular: Negative.    Gastrointestinal: Negative.    Endocrine: Negative.    Genitourinary: Negative.    Musculoskeletal: Negative.    Skin: Negative.    Allergic/Immunologic: Negative.    Neurological: Negative.    Hematological: Negative.    Psychiatric/Behavioral: The patient is nervous/anxious.    Superior score is 8/24    Objective     BP 90/54 (BP Location: Left arm, Patient Position: Sitting, Cuff Size: Adult)   Pulse 68   Ht 162.6 cm (64\")   Wt 52.3 kg (115 lb 6.4 oz)   SpO2 95%   BMI 19.81 kg/m²     Physical Exam  Patient appears to be awake and alert.  She does not appear to be in acute respiratory distress.    She is normocephalic.  She has Mallampati class II anatomy    Lungs are clear    Cardiac exam revealed normal S1 and S2.  Extremities showed no edema.    Previous home sleep test showed an AHI of 8.7.    Assessment/Plan   Diagnoses and all orders for this visit:    1. Snoring (Primary)  -     Detailed AutoPAP Order    2. Obstructive sleep apnea, adult  -     Detailed AutoPAP Order    Patient has a history of mild obstructive sleep apnea.  She has had surgery but still has apparently mild snoring and morning headaches and is not rested.  She is interested in getting CPAP.  We will place orders for that.  We will order an AutoSet device.  We will have her return in 2 months and we can then download her machine and see if it is correcting her events.  She is encouraged to maintain ideal body weight.  She is encouraged to avoid alcohol and sedatives close to bedtime.  She is encouraged to practice lateral position sleep.  We will reassess her on her return.    Total time: 20 minutes exclusive of procedures.             John John MD [facility]  Sleep Medicine  Pulmonary and Critical Care Medicine      05/25/21  05:57 EDT  "

## 2022-11-17 ENCOUNTER — LAB (OUTPATIENT)
Dept: LAB | Facility: HOSPITAL | Age: 27
End: 2022-11-17

## 2022-11-17 ENCOUNTER — PATIENT ROUNDING (BHMG ONLY) (OUTPATIENT)
Dept: FAMILY MEDICINE CLINIC | Facility: CLINIC | Age: 27
End: 2022-11-17

## 2022-11-17 ENCOUNTER — OFFICE VISIT (OUTPATIENT)
Dept: FAMILY MEDICINE CLINIC | Facility: CLINIC | Age: 27
End: 2022-11-17

## 2022-11-17 VITALS
HEIGHT: 64 IN | HEART RATE: 88 BPM | BODY MASS INDEX: 19.39 KG/M2 | OXYGEN SATURATION: 99 % | WEIGHT: 113.6 LBS | DIASTOLIC BLOOD PRESSURE: 80 MMHG | SYSTOLIC BLOOD PRESSURE: 112 MMHG

## 2022-11-17 DIAGNOSIS — R53.82 CHRONIC FATIGUE: Primary | ICD-10-CM

## 2022-11-17 DIAGNOSIS — R14.0 BLOATING: ICD-10-CM

## 2022-11-17 DIAGNOSIS — Z00.00 ENCOUNTER FOR MEDICAL EXAMINATION TO ESTABLISH CARE: ICD-10-CM

## 2022-11-17 DIAGNOSIS — F33.0 MILD EPISODE OF RECURRENT MAJOR DEPRESSIVE DISORDER: ICD-10-CM

## 2022-11-17 DIAGNOSIS — J30.2 SEASONAL ALLERGIES: ICD-10-CM

## 2022-11-17 DIAGNOSIS — L70.0 ACNE VULGARIS: ICD-10-CM

## 2022-11-17 PROBLEM — L70.9 ACNE: Status: ACTIVE | Noted: 2018-05-17

## 2022-11-17 PROBLEM — F41.9 ANXIETY: Status: ACTIVE | Noted: 2017-05-08

## 2022-11-17 LAB
25(OH)D3 SERPL-MCNC: 51.5 NG/ML (ref 30–100)
ALBUMIN SERPL-MCNC: 4.6 G/DL (ref 3.5–5.2)
ALBUMIN/GLOB SERPL: 2 G/DL
ALP SERPL-CCNC: 42 U/L (ref 39–117)
ALT SERPL W P-5'-P-CCNC: 19 U/L (ref 1–33)
ANION GAP SERPL CALCULATED.3IONS-SCNC: 9.4 MMOL/L (ref 5–15)
AST SERPL-CCNC: 27 U/L (ref 1–32)
BILIRUB SERPL-MCNC: 0.3 MG/DL (ref 0–1.2)
BUN SERPL-MCNC: 8 MG/DL (ref 6–20)
BUN/CREAT SERPL: 13.8 (ref 7–25)
CALCIUM SPEC-SCNC: 9.6 MG/DL (ref 8.6–10.5)
CHLORIDE SERPL-SCNC: 106 MMOL/L (ref 98–107)
CO2 SERPL-SCNC: 25.6 MMOL/L (ref 22–29)
CREAT SERPL-MCNC: 0.58 MG/DL (ref 0.57–1)
DEPRECATED RDW RBC AUTO: 40.1 FL (ref 37–54)
EGFRCR SERPLBLD CKD-EPI 2021: 128.2 ML/MIN/1.73
ERYTHROCYTE [DISTWIDTH] IN BLOOD BY AUTOMATED COUNT: 11.6 % (ref 12.3–15.4)
GLOBULIN UR ELPH-MCNC: 2.3 GM/DL
GLUCOSE SERPL-MCNC: 68 MG/DL (ref 65–99)
HCT VFR BLD AUTO: 42.5 % (ref 34–46.6)
HGB BLD-MCNC: 14.1 G/DL (ref 12–15.9)
MCH RBC QN AUTO: 31.5 PG (ref 26.6–33)
MCHC RBC AUTO-ENTMCNC: 33.2 G/DL (ref 31.5–35.7)
MCV RBC AUTO: 95.1 FL (ref 79–97)
PLATELET # BLD AUTO: 199 10*3/MM3 (ref 140–450)
PMV BLD AUTO: 11.2 FL (ref 6–12)
POTASSIUM SERPL-SCNC: 3.5 MMOL/L (ref 3.5–5.2)
PROT SERPL-MCNC: 6.9 G/DL (ref 6–8.5)
RBC # BLD AUTO: 4.47 10*6/MM3 (ref 3.77–5.28)
SODIUM SERPL-SCNC: 141 MMOL/L (ref 136–145)
TSH SERPL DL<=0.05 MIU/L-ACNC: 1.7 UIU/ML (ref 0.27–4.2)
VIT B12 BLD-MCNC: 824 PG/ML (ref 211–946)
WBC NRBC COR # BLD: 4.98 10*3/MM3 (ref 3.4–10.8)

## 2022-11-17 PROCEDURE — 99204 OFFICE O/P NEW MOD 45 MIN: CPT | Performed by: STUDENT IN AN ORGANIZED HEALTH CARE EDUCATION/TRAINING PROGRAM

## 2022-11-17 PROCEDURE — 86003 ALLG SPEC IGE CRUDE XTRC EA: CPT | Performed by: STUDENT IN AN ORGANIZED HEALTH CARE EDUCATION/TRAINING PROGRAM

## 2022-11-17 PROCEDURE — 82306 VITAMIN D 25 HYDROXY: CPT | Performed by: STUDENT IN AN ORGANIZED HEALTH CARE EDUCATION/TRAINING PROGRAM

## 2022-11-17 PROCEDURE — 84443 ASSAY THYROID STIM HORMONE: CPT | Performed by: STUDENT IN AN ORGANIZED HEALTH CARE EDUCATION/TRAINING PROGRAM

## 2022-11-17 PROCEDURE — 82607 VITAMIN B-12: CPT | Performed by: STUDENT IN AN ORGANIZED HEALTH CARE EDUCATION/TRAINING PROGRAM

## 2022-11-17 PROCEDURE — 86364 TISS TRNSGLTMNASE EA IG CLAS: CPT | Performed by: STUDENT IN AN ORGANIZED HEALTH CARE EDUCATION/TRAINING PROGRAM

## 2022-11-17 PROCEDURE — 86231 EMA EACH IG CLASS: CPT | Performed by: STUDENT IN AN ORGANIZED HEALTH CARE EDUCATION/TRAINING PROGRAM

## 2022-11-17 PROCEDURE — 82784 ASSAY IGA/IGD/IGG/IGM EACH: CPT | Performed by: STUDENT IN AN ORGANIZED HEALTH CARE EDUCATION/TRAINING PROGRAM

## 2022-11-17 PROCEDURE — 85027 COMPLETE CBC AUTOMATED: CPT | Performed by: STUDENT IN AN ORGANIZED HEALTH CARE EDUCATION/TRAINING PROGRAM

## 2022-11-17 PROCEDURE — 80053 COMPREHEN METABOLIC PANEL: CPT | Performed by: STUDENT IN AN ORGANIZED HEALTH CARE EDUCATION/TRAINING PROGRAM

## 2022-11-17 RX ORDER — BUPROPION HYDROCHLORIDE 150 MG/1
150 TABLET ORAL DAILY
Qty: 30 TABLET | Refills: 2 | OUTPATIENT
Start: 2022-11-17 | End: 2023-01-19

## 2022-11-17 RX ORDER — BUPROPION HYDROCHLORIDE 150 MG/1
150 TABLET ORAL DAILY
COMMUNITY
End: 2022-11-17 | Stop reason: SDUPTHER

## 2022-11-17 NOTE — PROGRESS NOTES
"  New Patient Office Visit      Patient Name: Lauren REECE  : 1995   MRN: [id number]   Care Team: Patient Care Team:  Lizbet Morgan DO as PCP - General (Internal Medicine)    Chief Complaint:    Chief Complaint   Patient presents with   • new patient preventative medicine service       History of Present Illness: Lauren REECE is a 27 y.o. female with anxiety, depression, mild sleep apnea who is here today to establish care. Previously following with Dr. Ram for PCP needs. She is  and works in .     Reports chronic fatigue ongoing for several years. She follows with sleep medicine for mild sleep apnea and snoring. She did not find that CPAP was helpful. She has had labs done in the past and reports unrevealing results.     Depression - she recently started wellbutrin about 2 weeks ago through online system \"[employer]\". She has been tolerating this well so far. Denies side effects such as irritability, anxiety, insomnia, appetite suppressant. She was previously on lexapro for several years and recently weaned off due to feeling it was ineffective.     She has followed with Dermatology in the past for her acne. Using sulfur based faced wash, retin-A, and topical dapsone. Previously taking spironolactone but no longer feels she needs it.    She reports concerns for seasonal allergies - cough, runny nose, sneezing. She is also concerned with food allergies affecting her skin (acne) and causing bloating. Artificial sugar causes GI discomfort and break outs. Denies changes in bowel habits, diarrhea, constipation, hematochezia.. Denies rashes. She requests referral to allergist.      Subjective      Review of Systems:   Review of Systems - See HPI    Past Medical History:   Past Medical History:   Diagnosis Date   • Anxiety    • Depression    • GERD (gastroesophageal reflux disease)    • Mental disorder     ANXIETY       Past Surgical History:   Past Surgical History:   Procedure Laterality Date " "  • TONSILLECTOMY  2020   • WISDOM TOOTH EXTRACTION  2015       Family History:   Family History   Problem Relation Age of Onset   • Hypertension Mother    • Hypertension Maternal Grandmother    • Hypertension Maternal Grandfather        Social History:   Social History     Socioeconomic History   • Marital status:    Tobacco Use   • Smoking status: Never   • Smokeless tobacco: Never   Vaping Use   • Vaping Use: Never used   Substance and Sexual Activity   • Alcohol use: No   • Drug use: No   • Sexual activity: Defer       Tobacco History:   Social History     Tobacco Use   Smoking Status Never   Smokeless Tobacco Never       Medications:     Current Outpatient Medications:   •  buPROPion XL (WELLBUTRIN XL) 150 MG 24 hr tablet, Take 1 tablet by mouth Daily., Disp: 30 tablet, Rfl: 2  •  sulfacetamide sodium-sulfur (AVAR,PLEXION) 10-5 % topical emulsion, sulfacetamide sodium-sulfur 10 %-5 % (w/w) topical cleanser  WASH THE AFFECTED AREA(S) BY TOPICAL ROUTE ONCE DAILY, Disp: , Rfl:   •  tretinoin (RETIN-A) 0.05 % cream, tretinoin 0.05 % topical cream  APPLY TO THE AFFECTED AREA(S) BY TOPICAL ROUTE ONCE DAILY AT BEDTIME, Disp: , Rfl:     Allergies:   Allergies   Allergen Reactions   • Cephalexin Swelling       Objective     Physical Exam:  Vital Signs:   Vitals:    11/17/22 1010   BP: 112/80   Pulse: 88   SpO2: 99%   Weight: 51.5 kg (113 lb 9.6 oz)   Height: 162.6 cm (64\")     Body mass index is 19.5 kg/m².     Physical Exam  Vitals reviewed.   Constitutional:       Appearance: Normal appearance. She is not ill-appearing.   Cardiovascular:      Rate and Rhythm: Normal rate and regular rhythm.      Pulses: Normal pulses.      Heart sounds: Normal heart sounds.   Pulmonary:      Effort: Pulmonary effort is normal. No respiratory distress.   Abdominal:      General: Abdomen is flat. There is no distension.      Palpations: Abdomen is soft.      Tenderness: There is no abdominal tenderness. There is no guarding or " rebound.   Skin:     General: Skin is warm and dry.   Neurological:      Mental Status: She is alert.   Psychiatric:         Mood and Affect: Mood normal.         Behavior: Behavior normal.         Judgment: Judgment normal.         Assessment / Plan      Assessment/Plan:   Problems Addressed This Visit  Diagnoses and all orders for this visit:    1. Chronic fatigue (Primary)  -     CBC (No Diff); Future  -     Comprehensive Metabolic Panel; Future  -     TSH; Future  -     Vitamin B12; Future  -     Vitamin D,25-Hydroxy; Future    Will obtain labs today to evaluate for underlying metabolic dysfunction or deficiency. Discussed importance of healthy sleep hygiene habits.   Will treat underlying depression. Wellbutrin is a good option as this may help with overall energy.    2. Mild episode of recurrent major depressive disorder (HCC)  -     buPROPion XL (WELLBUTRIN XL) 150 MG 24 hr tablet; Take 1 tablet by mouth Daily.  Dispense: 30 tablet; Refill: 2  -     CBC (No Diff); Future  -     Comprehensive Metabolic Panel; Future  -     TSH; Future  -     Vitamin B12; Future  -     Vitamin D,25-Hydroxy; Future    Continue wellbutrin 150mg daily without changes. She has only been taking this for 2 weeks and tolerating well thus far. Will follow up in 4 weeks to reassess dose/effectiveness. We discussed possible side effects and she will let me know if she experiences any of these.    3. Encounter for medical examination to establish care  Discussed importance of preventative care including vaccinations, age appropriate cancer screening, routine lab work, healthy diet, and active lifestyle.  Will be due for an annual once mood has stabilized.    4. Seasonal allergies  -     Ambulatory Referral to Allergy    5. Acne vulgaris  Previously following with dermatology, feels symptoms are well controlled now with topical therapy. Continue without changes.     6. Bloating  -     Food Allergy Profile; Future  -     Ambulatory Referral  to Allergy  -     Comprehensive Metabolic Panel; Future  -     Celiac Disease Panel; Future    She expresses concerns for food allergies causing break out and bloating. Discussed limitation to food allergy testing. Will obtain food allergy profile, celiac screening and referred to allergy per her request. Recommended low FODMAPs diet which may help with bloating/overall inflammation. Avoid known triggers such as artificial sweeteners.       Plan of care reviewed with patient at the conclusion of today's visit. Education was provided regarding diagnosis and management.  Patient verbalizes understanding of and agreement with management plan.      Follow Up:   Return in about 4 weeks (around 12/15/2022) for Recheck mood.          DO KENIA Bashir RD  [facility]  [address]  [facility] [address]  Fax [phone]  Phone [phone]

## 2022-11-21 LAB
ENDOMYSIUM IGA SER QL: NEGATIVE
IGA SERPL-MCNC: 73 MG/DL (ref 87–352)
TTG IGA SER-ACNC: <2 U/ML (ref 0–3)
TTG IGG SER-ACNC: <2 U/ML (ref 0–5)

## 2022-11-24 LAB
CLAM IGE QN: <0.1 KU/L
CODFISH IGE QN: <0.1 KU/L
CONV CLASS DESCRIPTION: ABNORMAL
CORN IGE QN: <0.1 KU/L
COW MILK IGE QN: <0.1 KU/L
EGG WHITE IGE QN: <0.1 KU/L
PEANUT IGE QN: <0.1 KU/L
SCALLOP IGE QN: <0.1 KU/L
SESAME SEED IGE QN: 0.13 KU/L
SHRIMP IGE QN: <0.1 KU/L
SOYBEAN IGE QN: <0.1 KU/L
WALNUT IGE QN: <0.1 KU/L
WHEAT IGE QN: <0.1 KU/L

## 2023-01-11 ENCOUNTER — TELEPHONE (OUTPATIENT)
Dept: FAMILY MEDICINE CLINIC | Facility: CLINIC | Age: 28
End: 2023-01-11
Payer: OTHER GOVERNMENT

## 2023-01-11 DIAGNOSIS — L70.0 ACNE VULGARIS: Primary | ICD-10-CM

## 2023-01-11 RX ORDER — SULFACETAMIDE SODIUM AND SULFUR 10; 5 MG/G; MG/G
1 RINSE TOPICAL DAILY
Qty: 227 G | Refills: 1 | Status: SHIPPED | OUTPATIENT
Start: 2023-01-11

## 2023-01-11 NOTE — TELEPHONE ENCOUNTER
Caller: Lauren REECE    Relationship: Self    Best call back number: [phone]    Requested Prescriptions:   sulfacetamide sodium-sulfur (AVAR,PLEXION) 10-5 % topical emulsion    Pharmacy where request should be sent: [facility] [id number] - [phone]  - [phone]     Additional details provided by patient:     Does the patient have less than a 3 day supply:  [x] Yes  [] No    Would you like a call back once the refill request has been completed: [x] Yes [] No    If the office needs to give you a call back, can they leave a voicemail: [] Yes [x] No    Jude Tony, PCT   01/11/23 10:29 EST

## 2023-01-11 NOTE — TELEPHONE ENCOUNTER
Contacted pt since we have never prescribed. Pt states she uses this face wash daily and it was prescribed by her previous PCP

## 2023-01-12 NOTE — TELEPHONE ENCOUNTER
Attempted to contact patient, no answer. Left voicemail detailed voicemail informing pt that script has been sent to the pharmacy & if she had additional questions to call the office back.

## 2023-01-30 ENCOUNTER — OFFICE VISIT (OUTPATIENT)
Dept: FAMILY MEDICINE CLINIC | Facility: CLINIC | Age: 28
End: 2023-01-30
Payer: OTHER GOVERNMENT

## 2023-01-30 ENCOUNTER — TELEPHONE (OUTPATIENT)
Dept: FAMILY MEDICINE CLINIC | Facility: CLINIC | Age: 28
End: 2023-01-30

## 2023-01-30 VITALS
OXYGEN SATURATION: 99 % | HEIGHT: 60 IN | HEART RATE: 70 BPM | WEIGHT: 112.6 LBS | SYSTOLIC BLOOD PRESSURE: 100 MMHG | BODY MASS INDEX: 22.1 KG/M2 | DIASTOLIC BLOOD PRESSURE: 66 MMHG

## 2023-01-30 DIAGNOSIS — F33.0 MILD EPISODE OF RECURRENT MAJOR DEPRESSIVE DISORDER: Primary | ICD-10-CM

## 2023-01-30 DIAGNOSIS — Z30.09 ENCOUNTER FOR OTHER GENERAL COUNSELING OR ADVICE ON CONTRACEPTION: ICD-10-CM

## 2023-01-30 DIAGNOSIS — L70.0 ACNE VULGARIS: ICD-10-CM

## 2023-01-30 PROCEDURE — 99214 OFFICE O/P EST MOD 30 MIN: CPT | Performed by: STUDENT IN AN ORGANIZED HEALTH CARE EDUCATION/TRAINING PROGRAM

## 2023-01-30 RX ORDER — TRETINOIN 0.5 MG/G
CREAM TOPICAL NIGHTLY
Qty: 45 G | Refills: 0 | Status: SHIPPED | OUTPATIENT
Start: 2023-01-30

## 2023-01-30 NOTE — TELEPHONE ENCOUNTER
Caller: Lauren REECE    Relationship: Self    Best call back number:     What is the medical concern/diagnosis: MENTAL HEALTH    What specialty or service is being requested: MENTAL HEALTH REFERRAL    What is the provider, practice or medical service name: NA    What is the office location: [place]    What is the office phone number: NA    Any additional details:  THIS WAS DISCUSSED WITH HER APPT WITH DR LONGORIA AND PATIENT SAID THAT WITH HER INSURANCE, SHE WILL NEED A REFERRAL

## 2023-01-30 NOTE — PROGRESS NOTES
"  Established Office Visit      Patient Name: Lauren REECE  : 1995   MRN: [id number]   Care Team: Patient Care Team:  Lizbet Morgan DO as PCP - General (Internal Medicine)    Chief Complaint:    Chief Complaint   Patient presents with   • Depression     F/u       History of Present Illness: Lauren REECE is a 27 y.o. female with anxiety, depression, mild sleep apnea who is here today to follow up with mood.    She was previously taking wellbutrin but stopped due to interfering with her sleep. Interested in talk therapy. Wants to hold off on medications at this time  Sleeping habits have returned to normal.     Needs refill of tretinoin. Acne has been well controlled with this.    Subjective      Review of Systems:   Review of Systems - See HPI    I have reviewed and the following portions of the patient's history were updated as appropriate: past family history, past medical history, past social history, past surgical history and problem list.    Medications:     Current Outpatient Medications:   •  Sulfacetamide Sodium-Sulfur (Avar Cleanser) 10-5 % liquid, Apply 1 application topically Daily., Disp: 227 g, Rfl: 1  •  tretinoin (RETIN-A) 0.05 % cream, Apply  topically to the appropriate area as directed Every Night., Disp: 45 g, Rfl: 0    Allergies:   Allergies   Allergen Reactions   • Cephalexin Swelling       Objective     Physical Exam:  Vital Signs:   Vitals:    23 0921   BP: 100/66   Pulse: 70   SpO2: 99%   Weight: 51.1 kg (112 lb 9.6 oz)   Height: 152.4 cm (60\")     Body mass index is 21.99 kg/m².     Physical Exam  Vitals reviewed.   Constitutional:       Appearance: Normal appearance.   Cardiovascular:      Rate and Rhythm: Normal rate.   Pulmonary:      Effort: Pulmonary effort is normal. No respiratory distress.   Skin:     General: Skin is warm and dry.   Neurological:      Mental Status: She is alert.   Psychiatric:         Mood and Affect: Mood normal.         Behavior: Behavior " normal.         Judgment: Judgment normal.         Assessment / Plan      Assessment/Plan:   Problems Addressed This Visit  Diagnoses and all orders for this visit:    1. Mild episode of recurrent major depressive disorder (HCC) (Primary)  Unable to tolerate wellbutrin due to side effects - wants to hold off on meds at this time. Focus on lifestyle modifications, exercise, healthy sleeping habits and therapy. Provided with options for in person therapy which do not require referral such as [facility] and [facility].     2. Acne vulgaris  -     tretinoin (RETIN-A) 0.05 % cream; Apply  topically to the appropriate area as directed Every Night.  Dispense: 45 g; Refill: 0  Well controlled with tretinoin and sulfur wash. Counseled patient that tretinoin is contraindicated in pregnancy and if she is sexually active she may want to consider birth control for contraception if wanting to prevent unexpected pregnancies. She is sexually active but not interested in contraception. We discussed several different options that are available to her in the event that she changes her mind. She expressed understanding and agreeable with returning if she changes her mind. Advised her to stop tretinoin immediately if she were to being trying to conceive or become pregnant.    3. Contraception counseling or advice     Plan of care reviewed with patient at the conclusion of today's visit. Education was provided regarding diagnosis and management.  Patient verbalizes understanding of and agreement with management plan.    Follow Up:   Return in about 6 months (around 7/30/2023) for Recheck .        DO KENIA Bashir RD  [facility]  [address]  [facility] [address]  Fax [phone]  Phone [phone]

## 2023-02-16 ENCOUNTER — OFFICE VISIT (OUTPATIENT)
Dept: FAMILY MEDICINE CLINIC | Facility: CLINIC | Age: 28
End: 2023-02-16
Payer: OTHER GOVERNMENT

## 2023-02-16 VITALS
HEART RATE: 79 BPM | HEIGHT: 60 IN | BODY MASS INDEX: 22.5 KG/M2 | DIASTOLIC BLOOD PRESSURE: 70 MMHG | SYSTOLIC BLOOD PRESSURE: 110 MMHG | WEIGHT: 114.6 LBS | OXYGEN SATURATION: 99 %

## 2023-02-16 DIAGNOSIS — R10.31 RLQ ABDOMINAL PAIN: Primary | ICD-10-CM

## 2023-02-16 DIAGNOSIS — R10.9 FLANK PAIN: ICD-10-CM

## 2023-02-16 DIAGNOSIS — L70.0 ACNE VULGARIS: ICD-10-CM

## 2023-02-16 LAB
B-HCG UR QL: NEGATIVE
BILIRUB BLD-MCNC: NEGATIVE MG/DL
CLARITY, POC: CLEAR
COLOR UR: NORMAL
EXPIRATION DATE: NORMAL
EXPIRATION DATE: NORMAL
GLUCOSE UR STRIP-MCNC: NEGATIVE MG/DL
INTERNAL NEGATIVE CONTROL: NORMAL
INTERNAL POSITIVE CONTROL: NORMAL
KETONES UR QL: NEGATIVE
LEUKOCYTE EST, POC: NEGATIVE
Lab: NORMAL
Lab: NORMAL
NITRITE UR-MCNC: NEGATIVE MG/ML
PH UR: 6.5 [PH] (ref 5–8)
PROT UR STRIP-MCNC: NEGATIVE MG/DL
RBC # UR STRIP: NEGATIVE /UL
SP GR UR: 1.01 (ref 1–1.03)
UROBILINOGEN UR QL: NORMAL

## 2023-02-16 PROCEDURE — 81003 URINALYSIS AUTO W/O SCOPE: CPT | Performed by: STUDENT IN AN ORGANIZED HEALTH CARE EDUCATION/TRAINING PROGRAM

## 2023-02-16 PROCEDURE — 81025 URINE PREGNANCY TEST: CPT | Performed by: STUDENT IN AN ORGANIZED HEALTH CARE EDUCATION/TRAINING PROGRAM

## 2023-02-16 PROCEDURE — 99214 OFFICE O/P EST MOD 30 MIN: CPT | Performed by: STUDENT IN AN ORGANIZED HEALTH CARE EDUCATION/TRAINING PROGRAM

## 2023-02-16 RX ORDER — SPIRONOLACTONE 25 MG/1
25 TABLET ORAL DAILY
Qty: 30 TABLET | Refills: 2 | Status: SHIPPED | OUTPATIENT
Start: 2023-02-16

## 2023-02-16 RX ORDER — DICYCLOMINE HYDROCHLORIDE 10 MG/1
10 CAPSULE ORAL 3 TIMES DAILY PRN
Qty: 30 CAPSULE | Refills: 0 | Status: SHIPPED | OUTPATIENT
Start: 2023-02-16

## 2023-02-16 NOTE — PROGRESS NOTES
Established Office Visit      Patient Name: Lauren REECE  : 1995   MRN: [id number]   Care Team: Patient Care Team:  Lizbet Morgan DO as PCP - General (Internal Medicine)    Chief Complaint:    Chief Complaint   Patient presents with   • Flank Pain     Right side, x1 week, constant pain       History of Present Illness: Lauren REECE is a 27 y.o. female with anxiety, depression, mild sleep apnea who is here today to discuss flank pain and acne    Acne - previously well controlled with sulfur wash and topical retin A but over the past week it has returned. Reports history of hormonal acne around her chin whch she previously used spironolactone and this helped. Wants refills.    Reports dull right sided lower right abdominal and flank pain. ongoing for about 1 week. no hematuria, dysuria, changes in urine color or smell, diarrhea, constipation, nausea, vomiting, hematochezia or changes in appetite. She reports urinary frequency without difficulty emptying. No history of nephrolithiasis.     Last menstrual cycle was about 3 weeks ago.     Subjective      Review of Systems:   Review of Systems - See HPI    I have reviewed and the following portions of the patient's history were updated as appropriate: past family history, past medical history, past social history, past surgical history and problem list.    Medications:     Current Outpatient Medications:   •  Sulfacetamide Sodium-Sulfur (Avar Cleanser) 10-5 % liquid, Apply 1 application topically Daily., Disp: 227 g, Rfl: 1  •  tretinoin (RETIN-A) 0.05 % cream, Apply  topically to the appropriate area as directed Every Night., Disp: 45 g, Rfl: 0  •  dicyclomine (BENTYL) 10 MG capsule, Take 1 capsule by mouth 3 (Three) Times a Day As Needed (abdominal cramping)., Disp: 30 capsule, Rfl: 0  •  spironolactone (Aldactone) 25 MG tablet, Take 1 tablet by mouth Daily., Disp: 30 tablet, Rfl: 2    Allergies:   Allergies   Allergen Reactions   • Cephalexin Swelling  "      Objective     Physical Exam:  Vital Signs:   Vitals:    02/16/23 1104   BP: 110/70   Pulse: 79   SpO2: 99%   Weight: 52 kg (114 lb 9.6 oz)   Height: 152.4 cm (60\")     Body mass index is 22.38 kg/m².     Physical Exam  Vitals reviewed.   Constitutional:       Appearance: Normal appearance. She is not ill-appearing.   Cardiovascular:      Rate and Rhythm: Normal rate.      Pulses: Normal pulses.   Pulmonary:      Effort: Pulmonary effort is normal. No respiratory distress.   Abdominal:      General: Abdomen is flat.      Palpations: Abdomen is soft.      Tenderness: There is no right CVA tenderness or left CVA tenderness.      Comments: RLQ TTP without rebound or guarding   Skin:     General: Skin is warm and dry.   Neurological:      Mental Status: She is alert.   Psychiatric:         Mood and Affect: Mood normal.         Behavior: Behavior normal.         Judgment: Judgment normal.         Assessment / Plan      Assessment/Plan:   Problems Addressed This Visit  Diagnoses and all orders for this visit:    1. RLQ abdominal pain (Primary)  2. Flank pain  -     POC Urinalysis Dipstick, Automated  -     POCT pregnancy, urine  -     dicyclomine (BENTYL) 10 MG capsule; Take 1 capsule by mouth 3 (Three) Times a Day As Needed (abdominal cramping).  Dispense: 30 capsule; Refill: 0  UA negative. POC pregnancy negative   She is afebrile, well appearing, and physical exam is benign. We discussed multiple possible etiologies for mild intermittent RLQ discomfort. Recommend trial of bentyl prn. Advised her to monitor symptoms and notify our office if she develops worsening pain, change in bowel habits, fever, vomiting, or changes in PO intake. She expressed understanding and agreeable with plan.    3. Acne vulgaris  -     spironolactone (Aldactone) 25 MG tablet; Take 1 tablet by mouth Daily.  Dispense: 30 tablet; Refill: 2    Uncontrolled with current topical therapy  Will restart spironolactone 25mg daily and increase if " symptoms persist. She is not currently on birth control and she is sexually active. She uses barrier (condom) contraception strictly. We discussed the risk of spironolactone, specifically associated with fetal harm and that it is contraindicated during pregnancy. Advised her to STOP medication if she were to become pregnant or intentionally trying to conceive. She expressed understanding and agreeable.       Plan of care reviewed with patient at the conclusion of today's visit. Education was provided regarding diagnosis and management.  Patient verbalizes understanding of and agreement with management plan.    Follow Up:   Return in about 3 months (around 5/16/2023) for Recheck acne or sooner if needed.        DO KENIA Bashir RD  [facility]  [address]  [facility] [address]  Fax [phone]  Phone [phone]

## 2023-02-24 ENCOUNTER — TELEPHONE (OUTPATIENT)
Dept: FAMILY MEDICINE CLINIC | Facility: CLINIC | Age: 28
End: 2023-02-24
Payer: OTHER GOVERNMENT

## 2023-02-24 DIAGNOSIS — R53.82 CHRONIC FATIGUE: ICD-10-CM

## 2023-02-24 DIAGNOSIS — G47.33 OSA (OBSTRUCTIVE SLEEP APNEA): Primary | ICD-10-CM

## 2023-02-24 NOTE — TELEPHONE ENCOUNTER
Caller: Lauren REECE    Relationship: Self    Best call back number: [phone]    What is the medical concern/diagnosis: SLEEP APNEA     What specialty or service is being requested: SLEEP CLINIC     What is the provider, practice or medical service name: [facility]     What is the office location: [address] [place] [address]    What is the office phone number: [phone]    Any additional details: PATIENT STATES SHE ATTEMPTED TO SCHEDULE AN APPOINTMENT AT THIS LOCATION HOWEVER DUE TO NOT BEING SEEN AT THIS LOCATION IN AWHILE AND HER PROVIDER SHE HAD SEEN AT THIS LOCATION LAST HAS RETIRED SHE WAS TOLD SHE WILL NEED A REFERRAL FOR INSURANCE PURPOSES.

## 2023-06-06 ENCOUNTER — OFFICE VISIT (OUTPATIENT)
Dept: SLEEP MEDICINE | Facility: HOSPITAL | Age: 28
End: 2023-06-06
Payer: OTHER GOVERNMENT

## 2023-06-06 VITALS
SYSTOLIC BLOOD PRESSURE: 110 MMHG | OXYGEN SATURATION: 98 % | DIASTOLIC BLOOD PRESSURE: 61 MMHG | HEIGHT: 60 IN | BODY MASS INDEX: 23.56 KG/M2 | WEIGHT: 120 LBS | HEART RATE: 80 BPM

## 2023-06-06 DIAGNOSIS — G47.33 OSA (OBSTRUCTIVE SLEEP APNEA): Primary | ICD-10-CM

## 2023-06-06 DIAGNOSIS — G47.19 EXCESSIVE DAYTIME SLEEPINESS: ICD-10-CM

## 2023-06-06 RX ORDER — VALACYCLOVIR HYDROCHLORIDE 500 MG/1
TABLET, FILM COATED ORAL
COMMUNITY
Start: 2023-05-31

## 2023-06-06 NOTE — PROGRESS NOTES
Chief Complaint:   Chief Complaint   Patient presents with    Follow-up       HPI:    Lauren REECE is a 27 y.o. female here for follow-up of LEEP apnea and excessive daytime sleepiness.  Patient was last seen 5/21/2021 for mild obstructive sleep apnea with an AHI of 8.7.  Patient did try CPAP and was not tolerant.  Patient then had her tonsils removed by ENT also used MAD which hurt her mouth.  She still is not rested with heavy breathing.  She states she can sleep 10 hours nightly and is still excessively sleepy, however, she does put an Carson score of 3/24.  Patient is inquiring today about narcolepsy.  If MSLT is negative or if it is denied by the insurance I will fill we need to retest using medication to help her sleep, trying desensitization or if moderate or severe referred for inspire.  He does have some symptoms of hypnagogic hallucinations and sleep paralysis.  She does deny any nasal fractures or head injuries.      Current medications are:   Current Outpatient Medications:     spironolactone (Aldactone) 25 MG tablet, Take 1 tablet by mouth Daily., Disp: 30 tablet, Rfl: 2    Sulfacetamide Sodium-Sulfur (Avar Cleanser) 10-5 % liquid, Apply 1 application topically Daily., Disp: 227 g, Rfl: 1    tretinoin (RETIN-A) 0.05 % cream, Apply  topically to the appropriate area as directed Every Night., Disp: 45 g, Rfl: 0    valACYclovir (VALTREX) 500 MG tablet, , Disp: , Rfl: .      The patient's relevant past medical, surgical, family and social history were reviewed and updated in Epic as appropriate.       Review of Systems   Respiratory:  Positive for apnea.    Psychiatric/Behavioral:  Positive for sleep disturbance. The patient is nervous/anxious.    All other systems reviewed and are negative.      Objective:    Physical Exam  Constitutional:       Appearance: Normal appearance.   HENT:      Head: Normocephalic and atraumatic.   Cardiovascular:      Rate and Rhythm: Normal rate.   Pulmonary:      Effort:  Pulmonary effort is normal.      Breath sounds: Normal breath sounds.   Skin:     General: Skin is warm and dry.   Neurological:      Mental Status: She is alert and oriented to person, place, and time.   Psychiatric:         Behavior: Behavior normal.         Thought Content: Thought content normal.         Judgment: Judgment normal.         ASSESSMENT/PLAN    Diagnoses and all orders for this visit:    1. YVON (obstructive sleep apnea) (Primary)  -     Polysomnography 4 or more parameters; Future  -     Urine Drug Screen - Urine, Clean Catch; Future  -     Multiple sleep latency test without CPAP; Future    2. Excessive daytime sleepiness  -     Polysomnography 4 or more parameters; Future  -     Urine Drug Screen - Urine, Clean Catch; Future  -     Multiple sleep latency test without CPAP; Future        Counseled patient regarding multimodal approach with healthy nutrition, healthy sleep, regular physical activity, social activities, counseling, and medications. Encouraged to practice lateral sleep position. Avoid alcohol and sedatives close to bedtime.  We will move forward with MSLT and follow-up as appropriate.      I have reviewed the results of my evaluation and impression and discussed my recommendations in detail with the patient.      Signed by  Huyen Toth, APRN    June 6, 2023      CC: Lizbet Morgan DO Mashni, Nicole C, DO

## 2023-08-07 ENCOUNTER — OFFICE VISIT (OUTPATIENT)
Dept: FAMILY MEDICINE CLINIC | Facility: CLINIC | Age: 28
End: 2023-08-07
Payer: OTHER GOVERNMENT

## 2023-08-07 VITALS
WEIGHT: 117.4 LBS | HEART RATE: 94 BPM | DIASTOLIC BLOOD PRESSURE: 74 MMHG | BODY MASS INDEX: 23.05 KG/M2 | OXYGEN SATURATION: 99 % | SYSTOLIC BLOOD PRESSURE: 110 MMHG | HEIGHT: 60 IN

## 2023-08-07 DIAGNOSIS — Z13.220 SCREENING FOR CHOLESTEROL LEVEL: ICD-10-CM

## 2023-08-07 DIAGNOSIS — R53.82 CHRONIC FATIGUE: Primary | ICD-10-CM

## 2023-08-07 DIAGNOSIS — R79.89 LOW SERUM CORTISOL LEVEL: ICD-10-CM

## 2023-08-07 PROCEDURE — 99214 OFFICE O/P EST MOD 30 MIN: CPT | Performed by: STUDENT IN AN ORGANIZED HEALTH CARE EDUCATION/TRAINING PROGRAM

## 2023-08-07 NOTE — PROGRESS NOTES
"  Established Office Visit      Patient Name: Lauren REECE  : 1995   MRN: [id number]   Care Team: Patient Care Team:  Lizbet Morgan DO as PCP - General (Internal Medicine)    Chief Complaint:    Chief Complaint   Patient presents with    Abdominal Pain     Recheck        History of Present Illness: Lauren REECE is a 27 y.o. female with anxiety, depression, mild sleep apnea who is here today to discuss abnormal lab results and fatigue.    Has had concerns of chronic fatigue, excess daytime sleepiness ongoing for years. Has followed with sleep medicine and is due for repeat sleep study but working on getting this scheduled. She also follows with naturopath who performed Eritrean hormonal testing and revealed low cortisol level. Wants this followed up on along with some other labs her naturopath has requested.       Subjective      Review of Systems:   Review of Systems - See HPI    I have reviewed and the following portions of the patient's history were updated as appropriate: past family history, past medical history, past social history, past surgical history and problem list.    Medications:     Current Outpatient Medications:     Sulfacetamide Sodium-Sulfur (Avar Cleanser) 10-5 % liquid, Apply 1 application topically Daily., Disp: 227 g, Rfl: 1    tretinoin (RETIN-A) 0.05 % cream, Apply  topically to the appropriate area as directed Every Night., Disp: 45 g, Rfl: 0    valACYclovir (VALTREX) 500 MG tablet, , Disp: , Rfl:     Allergies:   Allergies   Allergen Reactions    Cephalexin Swelling       Objective     Physical Exam:  Vital Signs:   Vitals:    23 0923   BP: 110/74   Pulse: 94   SpO2: 99%   Weight: 53.3 kg (117 lb 6.4 oz)   Height: 152.4 cm (60\")     Body mass index is 22.93 kg/mý.     Physical Exam  Vitals reviewed.   Constitutional:       Appearance: Normal appearance.   Cardiovascular:      Rate and Rhythm: Normal rate.   Pulmonary:      Effort: Pulmonary effort is normal. No respiratory " distress.   Neurological:      Mental Status: She is alert.   Psychiatric:         Mood and Affect: Mood normal.         Behavior: Behavior normal.         Judgment: Judgment normal.       Assessment / Plan      Assessment/Plan:   Problems Addressed This Visit  Diagnoses and all orders for this visit:    1. Chronic fatigue (Primary)  -     Cortisol - AM; Future  -     CBC w AUTO Differential; Future  -     Comprehensive metabolic panel; Future  -     Vitamin D 25 hydroxy; Future  -     TSH; Future  -     High Sensitivity CRP; Future  -     Sedimentation rate, automated; Future  -     Hemoglobin A1c; Future  -     Insulin, Free & Total, Serum; Future  -     Ferritin; Future  -     Lipid panel; Future    2. Low serum cortisol level  -     Cortisol - AM; Future  -     CBC w AUTO Differential; Future  -     Comprehensive metabolic panel; Future  -     Vitamin D 25 hydroxy; Future  -     TSH; Future  -     High Sensitivity CRP; Future  -     Sedimentation rate, automated; Future  -     Hemoglobin A1c; Future  -     Insulin, Free & Total, Serum; Future  -     Ferritin; Future  -     Lipid panel; Future    3. Screening for cholesterol level  -     Lipid panel; Future        Chronic fatigue - Following with abdullahi, Dr. Lucila Maxwell-  had Micronesian testing performed which showed low AM cortisol level and 24 hour cortisol level. Reviewed these records and scanned into media.   [facility] - CBC with diff, CMP, vit D, TSH, hs-CRP, ESR, A1c, insulin, ferritin, lipid panel - will obtain today.   Encouraged her to continue following with sleep medicine, had planned for repeat sleep study but this has not been done.   Consider mental health contributing to chronic fatigue if anxiety/depression is uncontrolled. Previously took wellbutrin but stopped d/t side effects.     Plan of care reviewed with patient at the conclusion of today's visit. Education was provided regarding diagnosis and  management.  Patient verbalizes understanding of and agreement with management plan.    Follow Up:   No follow-ups on file.        DO KENIA Bashir RD  [facility]  [address]  [facility] [address]  Fax [phone]  Phone [phone]

## 2023-08-08 ENCOUNTER — LAB (OUTPATIENT)
Dept: LAB | Facility: HOSPITAL | Age: 28
End: 2023-08-08
Payer: OTHER GOVERNMENT

## 2023-08-08 DIAGNOSIS — R79.89 LOW SERUM CORTISOL LEVEL: ICD-10-CM

## 2023-08-08 DIAGNOSIS — R53.82 CHRONIC FATIGUE: ICD-10-CM

## 2023-08-08 DIAGNOSIS — Z13.220 SCREENING FOR CHOLESTEROL LEVEL: ICD-10-CM

## 2023-08-08 LAB
25(OH)D3 SERPL-MCNC: 55 NG/ML (ref 30–100)
ALBUMIN SERPL-MCNC: 4.4 G/DL (ref 3.5–5.2)
ALBUMIN/GLOB SERPL: 2 G/DL
ALP SERPL-CCNC: 44 U/L (ref 39–117)
ALT SERPL W P-5'-P-CCNC: 13 U/L (ref 1–33)
ANION GAP SERPL CALCULATED.3IONS-SCNC: 10.3 MMOL/L (ref 5–15)
AST SERPL-CCNC: 20 U/L (ref 1–32)
BASOPHILS # BLD AUTO: 0.02 10*3/MM3 (ref 0–0.2)
BASOPHILS NFR BLD AUTO: 0.5 % (ref 0–1.5)
BILIRUB SERPL-MCNC: 0.3 MG/DL (ref 0–1.2)
BUN SERPL-MCNC: 11 MG/DL (ref 6–20)
BUN/CREAT SERPL: 14.9 (ref 7–25)
CALCIUM SPEC-SCNC: 9.7 MG/DL (ref 8.6–10.5)
CHLORIDE SERPL-SCNC: 108 MMOL/L (ref 98–107)
CHOLEST SERPL-MCNC: 146 MG/DL (ref 0–200)
CO2 SERPL-SCNC: 25.7 MMOL/L (ref 22–29)
CORTIS AM PEAK SERPL-MCNC: 18.54 MCG/DL
CREAT SERPL-MCNC: 0.74 MG/DL (ref 0.57–1)
CRP SERPL-MCNC: 0.04 MG/DL (ref 0.01–0.5)
DEPRECATED RDW RBC AUTO: 40.4 FL (ref 37–54)
EGFRCR SERPLBLD CKD-EPI 2021: 114.6 ML/MIN/1.73
EOSINOPHIL # BLD AUTO: 0.07 10*3/MM3 (ref 0–0.4)
EOSINOPHIL NFR BLD AUTO: 1.9 % (ref 0.3–6.2)
ERYTHROCYTE [DISTWIDTH] IN BLOOD BY AUTOMATED COUNT: 11.8 % (ref 12.3–15.4)
ERYTHROCYTE [SEDIMENTATION RATE] IN BLOOD: 2 MM/HR (ref 0–20)
FERRITIN SERPL-MCNC: 52.3 NG/ML (ref 13–150)
GLOBULIN UR ELPH-MCNC: 2.2 GM/DL
GLUCOSE SERPL-MCNC: 90 MG/DL (ref 65–99)
HBA1C MFR BLD: 4.8 % (ref 4.8–5.6)
HCT VFR BLD AUTO: 43.6 % (ref 34–46.6)
HDLC SERPL-MCNC: 63 MG/DL (ref 40–60)
HGB BLD-MCNC: 14.9 G/DL (ref 12–15.9)
IMM GRANULOCYTES # BLD AUTO: 0 10*3/MM3 (ref 0–0.05)
IMM GRANULOCYTES NFR BLD AUTO: 0 % (ref 0–0.5)
LDLC SERPL CALC-MCNC: 74 MG/DL (ref 0–100)
LDLC/HDLC SERPL: 1.2 {RATIO}
LYMPHOCYTES # BLD AUTO: 1.86 10*3/MM3 (ref 0.7–3.1)
LYMPHOCYTES NFR BLD AUTO: 50 % (ref 19.6–45.3)
MCH RBC QN AUTO: 31.9 PG (ref 26.6–33)
MCHC RBC AUTO-ENTMCNC: 34.2 G/DL (ref 31.5–35.7)
MCV RBC AUTO: 93.4 FL (ref 79–97)
MONOCYTES # BLD AUTO: 0.34 10*3/MM3 (ref 0.1–0.9)
MONOCYTES NFR BLD AUTO: 9.1 % (ref 5–12)
NEUTROPHILS NFR BLD AUTO: 1.43 10*3/MM3 (ref 1.7–7)
NEUTROPHILS NFR BLD AUTO: 38.5 % (ref 42.7–76)
NRBC BLD AUTO-RTO: 0.3 /100 WBC (ref 0–0.2)
PLATELET # BLD AUTO: 178 10*3/MM3 (ref 140–450)
PMV BLD AUTO: 11.2 FL (ref 6–12)
POTASSIUM SERPL-SCNC: 4.1 MMOL/L (ref 3.5–5.2)
PROT SERPL-MCNC: 6.6 G/DL (ref 6–8.5)
RBC # BLD AUTO: 4.67 10*6/MM3 (ref 3.77–5.28)
SODIUM SERPL-SCNC: 144 MMOL/L (ref 136–145)
TRIGL SERPL-MCNC: 36 MG/DL (ref 0–150)
TSH SERPL DL<=0.05 MIU/L-ACNC: 2.38 UIU/ML (ref 0.27–4.2)
VLDLC SERPL-MCNC: 9 MG/DL (ref 5–40)
WBC NRBC COR # BLD: 3.72 10*3/MM3 (ref 3.4–10.8)

## 2023-08-08 PROCEDURE — 85025 COMPLETE CBC W/AUTO DIFF WBC: CPT

## 2023-08-08 PROCEDURE — 83036 HEMOGLOBIN GLYCOSYLATED A1C: CPT

## 2023-08-08 PROCEDURE — 83525 ASSAY OF INSULIN: CPT

## 2023-08-08 PROCEDURE — 80061 LIPID PANEL: CPT

## 2023-08-08 PROCEDURE — 82533 TOTAL CORTISOL: CPT

## 2023-08-08 PROCEDURE — 86141 C-REACTIVE PROTEIN HS: CPT

## 2023-08-08 PROCEDURE — 82306 VITAMIN D 25 HYDROXY: CPT

## 2023-08-08 PROCEDURE — 84443 ASSAY THYROID STIM HORMONE: CPT

## 2023-08-08 PROCEDURE — 85652 RBC SED RATE AUTOMATED: CPT

## 2023-08-08 PROCEDURE — 80053 COMPREHEN METABOLIC PANEL: CPT

## 2023-08-08 PROCEDURE — 82728 ASSAY OF FERRITIN: CPT

## 2023-08-08 PROCEDURE — 83527 ASSAY OF INSULIN: CPT

## 2023-08-12 LAB
INSULIN FREE SERPL-ACNC: 4.9 UU/ML
INSULIN SERPL-ACNC: 4.9 UU/ML

## 2023-11-01 DIAGNOSIS — L70.0 ACNE VULGARIS: ICD-10-CM

## 2023-11-01 RX ORDER — SULFACETAMIDE SODIUM AND SULFUR 10; 5 MG/G; MG/G
RINSE TOPICAL
Qty: 227 G | Refills: 0 | Status: SHIPPED | OUTPATIENT
Start: 2023-11-01

## 2023-11-01 NOTE — TELEPHONE ENCOUNTER
Rx Refill Note  Requested Prescriptions     Pending Prescriptions Disp Refills    Sulfacetamide Sodium-Sulfur 10-5 % liquid [Pharmacy Med Name: SOD SUL/SULF 10-5% EMU WASH 227GM] 227 g 1     Sig: APPLY TO THE AFFECTED AREA TOPICALLY ONCE DAILY      Last office visit with prescribing clinician: 8/7/2023   Last telemedicine visit with prescribing clinician: Visit date not found   Next office visit with prescribing clinician: Visit date not found                         Would you like a call back once the refill request has been completed: [] Yes [] No    If the office needs to give you a call back, can they leave a voicemail: [] Yes [] No    Iris Garcia MA  11/01/23, 13:06 EDT

## 2023-12-18 ENCOUNTER — TELEPHONE (OUTPATIENT)
Dept: FAMILY MEDICINE CLINIC | Facility: CLINIC | Age: 28
End: 2023-12-18
Payer: OTHER GOVERNMENT

## 2023-12-18 DIAGNOSIS — G47.33 OSA (OBSTRUCTIVE SLEEP APNEA): ICD-10-CM

## 2023-12-18 DIAGNOSIS — R53.82 CHRONIC FATIGUE: Primary | ICD-10-CM

## 2023-12-18 NOTE — TELEPHONE ENCOUNTER
Orders placed. She will need to follow up with sleep medicine for results and further management after testing.

## 2023-12-18 NOTE — TELEPHONE ENCOUNTER
Caller: Lauren REECE    Relationship: Self    Best call back number: [phone]     What is the medical concern/diagnosis: SLEEP APNEA    What specialty or service is being requested: SLEEP STUDY    What is the provider, practice or medical service name: [facility] [place]    What is the office location:     What is the office phone number:     Any additional details: SLEEP MEDICINE REFERRED HER FOR SLEEP STUDY BUT INSURANCE WON'T PAY FOR IT UNLESS REFERRAL COMES FROM US.

## 2024-01-25 ENCOUNTER — OFFICE VISIT (OUTPATIENT)
Dept: FAMILY MEDICINE CLINIC | Facility: CLINIC | Age: 29
End: 2024-01-25
Payer: OTHER GOVERNMENT

## 2024-01-25 VITALS
WEIGHT: 122 LBS | DIASTOLIC BLOOD PRESSURE: 72 MMHG | BODY MASS INDEX: 24.6 KG/M2 | HEIGHT: 59 IN | SYSTOLIC BLOOD PRESSURE: 110 MMHG

## 2024-01-25 DIAGNOSIS — L70.0 ACNE VULGARIS: ICD-10-CM

## 2024-01-25 DIAGNOSIS — H65.91 FLUID LEVEL BEHIND TYMPANIC MEMBRANE OF RIGHT EAR: Primary | ICD-10-CM

## 2024-01-25 DIAGNOSIS — B00.9 HSV INFECTION: ICD-10-CM

## 2024-01-25 PROCEDURE — 99214 OFFICE O/P EST MOD 30 MIN: CPT | Performed by: STUDENT IN AN ORGANIZED HEALTH CARE EDUCATION/TRAINING PROGRAM

## 2024-01-25 RX ORDER — ACYCLOVIR 50 MG/G
1 OINTMENT TOPICAL
Qty: 30 G | Refills: 1 | Status: SHIPPED | OUTPATIENT
Start: 2024-01-25

## 2024-01-25 RX ORDER — TRETINOIN 0.25 MG/G
GEL TOPICAL NIGHTLY
Qty: 45 G | Refills: 1 | Status: SHIPPED | OUTPATIENT
Start: 2024-01-25

## 2024-01-25 NOTE — PROGRESS NOTES
Established Office Visit      Patient Name: Lauren REECE  : 1995   MRN: [id number]   Care Team: Patient Care Team:  Lizbet Velazquez DO as PCP - General (Internal Medicine)    Chief Complaint:    Chief Complaint   Patient presents with    Earache     Pt co continued earache, pt had ear infection earlier this month       History of Present Illness: Lauren REECE is a 28 y.o. female who is here today to follow up with earache.    Diagnosed with bilateral ear inection 23 and prescribed augmentin x 10 days by [facility]. She finished course of abx and pain resolved completely. Unfortunately over the past 24 hours she has developed similar right ear pain and is concerned she may have another infection. Pain is coming and going. It is a little better today.  Describes on and off sore throat, wakes up in the morning to it feeling scratchy. Small amount of nasal congestion. No fevers. No hearing changes. No ear drainage.     Needs refill of tretinoin - uses every night/every other night and this controls acne well    Needs refill of acyclovir prn oral HSV flares. No current flare.     Subjective      Review of Systems:   Review of Systems - See HPI    I have reviewed and the following portions of the patient's history were updated as appropriate: past family history, past medical history, past social history, past surgical history and problem list.    Medications:     Current Outpatient Medications:     Sulfacetamide Sodium-Sulfur 10-5 % liquid, APPLY TO THE AFFECTED AREA TOPICALLY ONCE DAILY, Disp: 227 g, Rfl: 0    acyclovir (Zovirax) 5 % ointment, Apply 1 application  topically to the appropriate area as directed 5 (Five) Times a Day., Disp: 30 g, Rfl: 1    tretinoin (Retin-A) 0.025 % gel, Apply  topically to the appropriate area as directed Every Night., Disp: 45 g, Rfl: 1    Allergies:   Allergies   Allergen Reactions    Cephalexin Swelling       Objective     Physical Exam:  Vital Signs:   Vitals:     "01/25/24 1146   BP: 110/72   BP Location: Left arm   Patient Position: Sitting   Cuff Size: Adult   Weight: 55.3 kg (122 lb)   Height: 149.9 cm (59\")     Body mass index is 24.64 kg/m².     Physical Exam  Vitals reviewed.   Constitutional:       Appearance: Normal appearance.   HENT:      Right Ear: Ear canal and external ear normal.      Left Ear: Tympanic membrane, ear canal and external ear normal.      Ears:      Comments: Right middle ear effusion,TM is nonerythematous  Cardiovascular:      Rate and Rhythm: Normal rate.   Pulmonary:      Effort: Pulmonary effort is normal. No respiratory distress.   Skin:     General: Skin is warm and dry.   Neurological:      Mental Status: She is alert.   Psychiatric:         Mood and Affect: Mood normal.         Behavior: Behavior normal.         Judgment: Judgment normal.         Assessment / Plan      Assessment/Plan:   Problems Addressed This Visit  Diagnoses and all orders for this visit:    1. Fluid level behind tympanic membrane of right ear (Primary)  No evidence for bacterial infection - avoid additional abx  Recommend flonase daily   If persistent symptoms, can consider ENT referral     2. Acne vulgaris  -     tretinoin (Retin-A) 0.025 % gel; Apply  topically to the appropriate area as directed Every Night.  Dispense: 45 g; Refill: 1    3. HSV infection  -     acyclovir (Zovirax) 5 % ointment; Apply 1 application  topically to the appropriate area as directed 5 (Five) Times a Day.  Dispense: 30 g; Refill: 1          Plan of care reviewed with patient at the conclusion of today's visit. Education was provided regarding diagnosis and management.  Patient verbalizes understanding of and agreement with management plan.    Follow Up:   No follow-ups on file.        DO KENIA De Santiago RD  [facility]  [address]  [facility] [address]  Fax [phone]  Phone [phone]      "

## 2024-04-11 DIAGNOSIS — L70.0 ACNE VULGARIS: ICD-10-CM

## 2024-04-11 RX ORDER — SULFACETAMIDE SODIUM, SULFUR 100; 50 MG/G; MG/G
EMULSION TOPICAL
Qty: 227 G | Refills: 0 | Status: SHIPPED | OUTPATIENT
Start: 2024-04-11

## 2024-04-26 ENCOUNTER — OFFICE VISIT (OUTPATIENT)
Dept: FAMILY MEDICINE CLINIC | Facility: CLINIC | Age: 29
End: 2024-04-26
Payer: OTHER GOVERNMENT

## 2024-04-26 ENCOUNTER — TELEPHONE (OUTPATIENT)
Dept: FAMILY MEDICINE CLINIC | Facility: CLINIC | Age: 29
End: 2024-04-26

## 2024-04-26 VITALS
DIASTOLIC BLOOD PRESSURE: 72 MMHG | WEIGHT: 120 LBS | OXYGEN SATURATION: 99 % | BODY MASS INDEX: 24.19 KG/M2 | SYSTOLIC BLOOD PRESSURE: 120 MMHG | HEIGHT: 59 IN | HEART RATE: 83 BPM

## 2024-04-26 DIAGNOSIS — R22.30 AXILLARY FULLNESS: Primary | ICD-10-CM

## 2024-04-26 DIAGNOSIS — L70.0 ACNE VULGARIS: ICD-10-CM

## 2024-04-26 DIAGNOSIS — H61.22 IMPACTED CERUMEN OF LEFT EAR: ICD-10-CM

## 2024-04-26 DIAGNOSIS — M79.629 AXILLARY TENDERNESS, UNSPECIFIED LATERALITY: ICD-10-CM

## 2024-04-26 DIAGNOSIS — Z01.419 ROUTINE GYNECOLOGICAL EXAMINATION: ICD-10-CM

## 2024-04-26 DIAGNOSIS — R14.0 BLOATING: ICD-10-CM

## 2024-04-26 RX ORDER — TRETINOIN 0.25 MG/G
GEL TOPICAL NIGHTLY
Qty: 45 G | Refills: 1 | Status: SHIPPED | OUTPATIENT
Start: 2024-04-26

## 2024-04-26 NOTE — TELEPHONE ENCOUNTER
Caller: Lauren REECE    Relationship: Self    Best call back number: [phone]    Which medication are you concerned about:     tretinoin (Retin-A) 0.025 % gel     What are your concerns:     PHARMACY STATES ALL THEY SEE IS THE TRETINOIN CREAM.    PLEASE CALL IN THE GEL

## 2024-04-26 NOTE — PROGRESS NOTES
Established Office Visit      Patient Name: Lauren REECE  : 1995   MRN: [id number]   Care Team: Patient Care Team:  Lizbet Velazquez DO as PCP - General (Internal Medicine)    Chief Complaint:    Chief Complaint   Patient presents with    Mass     Arm pit area, swollen     Bloated       History of Present Illness: Lauren REECE is a 28 y.o. female who is here today to follow up with concerns as detailed below.    She reports right sided axillary swelling. Coming and going over the past few years. Does not feel it has increased in size. Sometimes painful. No pattern to when it comes/goes. No breast concerns.    She requests referral to [facility] to establish care for routine GYN needs. Overdue on pap.    She reports left ear fullness/discomfort, coming and going. Worse yesterday but still somewhat present today. No significant allergies/congestion. No fevers.     She has concerns of persistent bloating. Epigastric discomfort with this at times. Has brought this concern up in the past and labs were unrevealing. No improvement with PPI trial. Interested in further workup. Denies indigestion, nausea, vomiting, diarrhea.       Subjective      Review of Systems:   Review of Systems - See HPI    I have reviewed and the following portions of the patient's history were updated as appropriate: past family history, past medical history, past social history, past surgical history and problem list.    Medications:     Current Outpatient Medications:     acyclovir (Zovirax) 5 % ointment, Apply 1 application  topically to the appropriate area as directed 5 (Five) Times a Day., Disp: 30 g, Rfl: 1    Sulfacetamide Sodium-Sulfur 10-5 % liquid, APPLY TOPICALLY TO THE AFFECTED AREA EVERY DAY, Disp: 227 g, Rfl: 0    tretinoin (Retin-A) 0.025 % gel, Apply  topically to the appropriate area as directed Every Night., Disp: 45 g, Rfl: 1    Allergies:   Allergies   Allergen Reactions    Cephalexin Swelling  "      Objective     Physical Exam:  Vital Signs:   Vitals:    04/26/24 1102   BP: 120/72   Pulse: 83   SpO2: 99%   Weight: 54.4 kg (120 lb)   Height: 149.9 cm (59\")     Body mass index is 24.24 kg/m².     Physical Exam  Vitals reviewed.   Constitutional:       Appearance: Normal appearance.   HENT:      Right Ear: Tympanic membrane, ear canal and external ear normal. There is no impacted cerumen.      Left Ear: External ear normal. There is impacted cerumen.      Ears:      Comments: TM visualized following irrigation - normal   Cardiovascular:      Rate and Rhythm: Normal rate.      Pulses: Normal pulses.   Pulmonary:      Effort: Pulmonary effort is normal. No respiratory distress.   Musculoskeletal:      Comments: Bilateral axillary fullness without distinct borders/LAD. Tender to palpation bilaterally. R>L. No breast tenderness or palpable masses.    Skin:     General: Skin is warm and dry.   Neurological:      Mental Status: She is alert.   Psychiatric:         Mood and Affect: Mood normal.         Behavior: Behavior normal.         Judgment: Judgment normal.       Ear Cerumen Removal    Date/Time: 4/26/2024 12:24 PM    Performed by: Lizbet Velazquez DO  Authorized by: Lizbet Velazquez DO    Anesthesia:  Local Anesthetic: none  Location details: left ear  Patient tolerance: patient tolerated the procedure well with no immediate complications  Procedure type: irrigation   Sedation:  Patient sedated: no              Assessment / Plan      Assessment/Plan:   Problems Addressed This Visit  Diagnoses and all orders for this visit:    1. Axillary fullness (Primary)  2. Axillary tenderness, unspecified laterality  -     US Axilla Bilateral; Future  Ongoing for years - will obtain US for further evaluation    3. Routine gynecological examination  -     Ambulatory Referral to Gynecology    4. Bloating  -     Ambulatory Referral to Gastroenterology  Unrevealing labs, no response to elimination diets and unresponsive " to PPI. Interested in GI consult and she has been referred today     5. Impacted cerumen of left ear  -     Ear Cerumen Removal  TM visualized following irrigation - normal, no effusion or evidence of infection        Plan of care reviewed with patient at the conclusion of today's visit. Education was provided regarding diagnosis and management.  Patient verbalizes understanding of and agreement with management plan.    Follow Up:   No follow-ups on file.        DO KENIA De Santiago RD  [facility]  [address]  [facility] [address]  Fax [phone]  Phone [phone]

## 2024-05-10 ENCOUNTER — HOSPITAL ENCOUNTER (OUTPATIENT)
Dept: ULTRASOUND IMAGING | Facility: HOSPITAL | Age: 29
Discharge: HOME | End: 2024-05-10
Admitting: STUDENT IN AN ORGANIZED HEALTH CARE EDUCATION/TRAINING PROGRAM
Payer: OTHER GOVERNMENT

## 2024-05-10 DIAGNOSIS — R22.30 AXILLARY FULLNESS: ICD-10-CM

## 2024-05-10 DIAGNOSIS — M79.629 AXILLARY TENDERNESS, UNSPECIFIED LATERALITY: ICD-10-CM

## 2024-05-10 PROCEDURE — 76882 US LMTD JT/FCL EVL NVASC XTR: CPT

## 2024-05-24 ENCOUNTER — OFFICE VISIT (OUTPATIENT)
Dept: GASTROENTEROLOGY | Facility: CLINIC | Age: 29
End: 2024-05-24
Payer: OTHER GOVERNMENT

## 2024-05-24 VITALS
SYSTOLIC BLOOD PRESSURE: 134 MMHG | BODY MASS INDEX: 24.39 KG/M2 | TEMPERATURE: 97.8 F | HEART RATE: 95 BPM | DIASTOLIC BLOOD PRESSURE: 55 MMHG | WEIGHT: 121 LBS | HEIGHT: 59 IN

## 2024-05-24 DIAGNOSIS — R14.0 ABDOMINAL BLOATING: Primary | ICD-10-CM

## 2024-05-24 DIAGNOSIS — R10.13 EPIGASTRIC PAIN: ICD-10-CM

## 2024-05-24 DIAGNOSIS — R10.11 RIGHT UPPER QUADRANT PAIN: ICD-10-CM

## 2024-05-24 NOTE — PROGRESS NOTES
Office Note     Name: Lauren REECE    : 1995     MRN: [id number]     Chief Complaint  Bloating, epigastric/RUQ discomfort    Subjective     History of Present Illness:  Lauren REECE is a 28 y.o. female who presents today as referral from PCP for further evaluation of bloating and epigastric discomfort, not responsive to omeprazole 20 mg daily or elimination diets.  She also endorses occasional right upper quadrant pain that comes and goes.  No alleviating factors, but feels symptoms are worse with gluten intake.  She has had these symptoms for the last 3 years with prior workup.  H. pylori and celiac panel negative.  She was previously seen by ENT with laryngoscopy, and incidental findings of GERD.  She denies any obvious reflux symptoms at this time and is not interested in taking PPI right now.  She is chronically constipated.  Does not use any medications for this.  She denies any family history of colon cancer or IBD.  She denies any dysphagia, odynophagia, melena, unintentional weight loss, nausea, or vomiting.    Past Medical History:   Past Medical History:   Diagnosis Date    Anxiety     Depression     GERD (gastroesophageal reflux disease)     Mental disorder     ANXIETY       Past Surgical History:   Past Surgical History:   Procedure Laterality Date    TONSILLECTOMY      WISDOM TOOTH EXTRACTION         Immunizations:   Immunization History   Administered Date(s) Administered    Tdap 2023        Medications:     Current Outpatient Medications:     acyclovir (Zovirax) 5 % ointment, Apply 1 application  topically to the appropriate area as directed 5 (Five) Times a Day., Disp: 30 g, Rfl: 1    Sulfacetamide Sodium-Sulfur 10-5 % liquid, APPLY TOPICALLY TO THE AFFECTED AREA EVERY DAY, Disp: 227 g, Rfl: 0    tretinoin (Retin-A) 0.025 % gel, Apply  topically to the appropriate area as directed Every Night., Disp: 45 g, Rfl: 1    Allergies:   Allergies   Allergen Reactions    Cephalexin  "Swelling       Family History:   Family History   Problem Relation Age of Onset    Hypertension Mother     Hypertension Maternal Grandmother     Hypertension Maternal Grandfather        Social History:   Social History     Socioeconomic History    Marital status:    Tobacco Use    Smoking status: Never    Smokeless tobacco: Never   Vaping Use    Vaping status: Never Used   Substance and Sexual Activity    Alcohol use: No    Drug use: No    Sexual activity: Yes     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      Objective     Vital Signs  There were no vitals taken for this visit.  Estimated body mass index is 24.24 kg/m² as calculated from the following:    Height as of 4/26/24: 149.9 cm (59\").    Weight as of 4/26/24: 54.4 kg (120 lb).    BMI is within normal parameters. No other follow-up for BMI required.      Physical Exam  Vitals reviewed.   Constitutional:       General: She is awake.   Cardiovascular:      Rate and Rhythm: Normal rate.   Pulmonary:      Effort: Pulmonary effort is normal.   Abdominal:      Palpations: Abdomen is soft.      Tenderness: There is no abdominal tenderness.   Skin:     General: Skin is warm and dry.   Neurological:      Mental Status: She is alert.        Assessment and Plan     Assessment & Plan  Epigastric pain    Right upper quadrant pain  Gallbladder ultrasound to rule out gallbladder etiology for right upper quadrant/epigastric pain  Suspect her epigastric pain could be caused by reflux, so would recommend trying PPI again at higher dose if current studies unrevealing. She was not interested in starting PPI today.       Abdominal bloating  Patient has trialed omeprazole 20 mg daily as well as elimination diets, without improvement in symptoms.  I suspect constipation may be contributing to her bloating and pain.  Starting MiraLAX, 1-3 doses/day to achieve soft, formed bowel movements.   If no improvement with MiraLAX, will plan to trial Trulance.  Prior H. pylori and " celiac panel negative.  Obtain C-SBT to rule out CSID  Recommend trying a low-FODMAP diet to see if symptoms improve.  [employer] message sent to patient with specifics.  Patient was inquiring about SIBO, although I have a low suspicion for this.  Offered trial of Xifaxan to see if symptoms improve, but she would prefer to wait until other tests are resulted.                 Follow Up  Follow-up in 3 months.    HAZEL López  [facility] [address]  [facility]STRO[facility]  [address] [address]

## 2024-05-24 NOTE — PATIENT INSTRUCTIONS
For constipation, recommend daily Miralax, 1-3 doses/day to achieve a soft, formed bowel movement.   Consume 25-30 g fiber/daily. This can take weeks to feel beneficial, but it's important for ongoing bowel health.   Aim for 64-80 ounces of water/day.   C-SBT test to rule out congenital sucraid isomaltase deficiency.   Follow-up in 3 months.

## 2024-06-18 ENCOUNTER — TELEPHONE (OUTPATIENT)
Dept: GASTROENTEROLOGY | Facility: CLINIC | Age: 29
End: 2024-06-18
Payer: OTHER GOVERNMENT

## 2024-06-18 DIAGNOSIS — R10.11 RUQ PAIN: Primary | ICD-10-CM

## 2024-06-18 NOTE — TELEPHONE ENCOUNTER
----- Message from Josie Montaño sent at 6/18/2024  1:38 PM EDT -----  Regarding: gallbladder US    I saw that the original order had been cancelled. Would you let her know that I placed new order and someone should be reaching out to schedule this? Thank you  ----- Message -----  From: Christina Thompson LPN  Sent: 6/14/2024   9:47 AM EDT  To: HAZEL López    Patient called and stated you were going to put in an order for a US. Please advise. Thank you

## 2024-06-18 NOTE — TELEPHONE ENCOUNTER
I called patient and informed her of the US order and to be expecting a call to schedule this. Patient verbalized understanding and had no further questions.

## 2024-07-02 ENCOUNTER — TELEPHONE (OUTPATIENT)
Dept: SLEEP MEDICINE | Facility: CLINIC | Age: 29
End: 2024-07-02
Payer: OTHER GOVERNMENT

## 2024-07-02 NOTE — TELEPHONE ENCOUNTER
.      Called and left voicemail for patient to return my call  Hub staff attempted to follow warm transfer process and was unsuccessful     Caller: FRANCA REECE    Relationship to patient: Emergency Contact    Best call back number:     [phone]       Patient is needing: THE PT HAS A QUESTION ABOUT THE SLEEP AID THAT WAS MENTIONED. IS IT A PRESCRIPTION SHE NEEDS TO  FROM THE PHARMACY OR WILL SHE GET SOMETHING WHEN SHE ARRIVES       How Severe Is Your Rash?: moderate Is This A New Presentation, Or A Follow-Up?: Rash

## 2024-07-08 ENCOUNTER — HOSPITAL ENCOUNTER (OUTPATIENT)
Dept: ULTRASOUND IMAGING | Facility: HOSPITAL | Age: 29
Discharge: HOME | End: 2024-07-08
Payer: OTHER GOVERNMENT

## 2024-07-08 ENCOUNTER — TELEPHONE (OUTPATIENT)
Dept: SLEEP MEDICINE | Age: 29
End: 2024-07-08
Payer: OTHER GOVERNMENT

## 2024-07-08 DIAGNOSIS — R10.11 RUQ PAIN: ICD-10-CM

## 2024-07-08 PROCEDURE — 76705 ECHO EXAM OF ABDOMEN: CPT

## 2024-07-08 NOTE — TELEPHONE ENCOUNTER
Huyen,    The patient called back and would like to discuss the sleep aid you mentioned during the last appointment. Can you please give her a call.    Thank you,   Blaise

## 2024-07-30 DIAGNOSIS — K21.9 GASTROESOPHAGEAL REFLUX DISEASE, UNSPECIFIED WHETHER ESOPHAGITIS PRESENT: Primary | ICD-10-CM

## 2024-07-30 RX ORDER — PANTOPRAZOLE SODIUM 40 MG/1
40 TABLET, DELAYED RELEASE ORAL DAILY
Qty: 30 TABLET | Refills: 11 | Status: SHIPPED | OUTPATIENT
Start: 2024-07-30

## 2024-08-07 ENCOUNTER — TRANSCRIBE ORDERS (OUTPATIENT)
Dept: LAB | Facility: HOSPITAL | Age: 29
End: 2024-08-07
Payer: OTHER GOVERNMENT

## 2024-08-07 ENCOUNTER — LAB (OUTPATIENT)
Dept: LAB | Facility: HOSPITAL | Age: 29
End: 2024-08-07
Payer: OTHER GOVERNMENT

## 2024-08-07 DIAGNOSIS — E28.2 POLYCYSTIC OVARIES: Primary | ICD-10-CM

## 2024-08-07 DIAGNOSIS — E28.2 POLYCYSTIC OVARIES: ICD-10-CM

## 2024-08-07 LAB
ESTRADIOL SERPL HS-MCNC: 124 PG/ML
FSH SERPL-ACNC: 5.2 MIU/ML
LH SERPL-ACNC: 7.3 MIU/ML
TSH SERPL DL<=0.05 MIU/L-ACNC: 1.12 UIU/ML (ref 0.27–4.2)

## 2024-08-07 PROCEDURE — 83498 ASY HYDROXYPROGESTERONE 17-D: CPT

## 2024-08-07 PROCEDURE — 84402 ASSAY OF FREE TESTOSTERONE: CPT

## 2024-08-07 PROCEDURE — 36415 COLL VENOUS BLD VENIPUNCTURE: CPT

## 2024-08-07 PROCEDURE — 83002 ASSAY OF GONADOTROPIN (LH): CPT

## 2024-08-07 PROCEDURE — 82670 ASSAY OF TOTAL ESTRADIOL: CPT

## 2024-08-07 PROCEDURE — 84443 ASSAY THYROID STIM HORMONE: CPT

## 2024-08-07 PROCEDURE — 82627 DEHYDROEPIANDROSTERONE: CPT

## 2024-08-07 PROCEDURE — 84403 ASSAY OF TOTAL TESTOSTERONE: CPT

## 2024-08-07 PROCEDURE — 83001 ASSAY OF GONADOTROPIN (FSH): CPT

## 2024-08-08 LAB — DHEA-S SERPL-MCNC: 236 UG/DL (ref 84.8–378)

## 2024-08-10 LAB
TESTOST FREE SERPL-MCNC: 0.4 PG/ML (ref 0–4.2)
TESTOST SERPL-MCNC: 18 NG/DL (ref 13–71)

## 2024-08-12 LAB — 17OHP SERPL-MCNC: 31 NG/DL

## 2024-08-23 ENCOUNTER — HOSPITAL ENCOUNTER (OUTPATIENT)
Dept: GENERAL RADIOLOGY | Facility: HOSPITAL | Age: 29
Discharge: HOME | End: 2024-08-23
Payer: OTHER GOVERNMENT

## 2024-08-23 ENCOUNTER — OFFICE VISIT (OUTPATIENT)
Dept: GASTROENTEROLOGY | Facility: CLINIC | Age: 29
End: 2024-08-23
Payer: OTHER GOVERNMENT

## 2024-08-23 VITALS
TEMPERATURE: 97.3 F | BODY MASS INDEX: 24.27 KG/M2 | SYSTOLIC BLOOD PRESSURE: 118 MMHG | WEIGHT: 120.4 LBS | HEART RATE: 87 BPM | DIASTOLIC BLOOD PRESSURE: 81 MMHG | HEIGHT: 59 IN

## 2024-08-23 DIAGNOSIS — R10.11 RIGHT UPPER QUADRANT ABDOMINAL PAIN: ICD-10-CM

## 2024-08-23 DIAGNOSIS — K21.9 GASTROESOPHAGEAL REFLUX DISEASE, UNSPECIFIED WHETHER ESOPHAGITIS PRESENT: Primary | ICD-10-CM

## 2024-08-23 DIAGNOSIS — K58.1 IRRITABLE BOWEL SYNDROME WITH CONSTIPATION: ICD-10-CM

## 2024-08-23 PROCEDURE — 74018 RADEX ABDOMEN 1 VIEW: CPT

## 2024-08-23 PROCEDURE — 99213 OFFICE O/P EST LOW 20 MIN: CPT

## 2024-08-23 RX ORDER — SPIRONOLACTONE 50 MG/1
TABLET, FILM COATED ORAL
COMMUNITY
Start: 2024-08-07

## 2024-08-23 RX ORDER — PLECANATIDE 3 MG/1
3 TABLET ORAL DAILY
Qty: 30 TABLET | Refills: 11 | Status: SHIPPED | OUTPATIENT
Start: 2024-08-23

## 2024-08-23 RX ORDER — PANTOPRAZOLE SODIUM 40 MG/1
40 TABLET, DELAYED RELEASE ORAL DAILY
Qty: 90 TABLET | Refills: 11 | Status: SHIPPED | OUTPATIENT
Start: 2024-08-23

## 2024-08-23 NOTE — PROGRESS NOTES
Office Note     Name: Lauren Osman    : 1995     MRN: [id number]     Chief Complaint  Bloated and Abdominal Pain    Subjective     History of Present Illness:  Lauren Osman is a 28 y.o. female who presents today for follow-up of abdominal pain and bloating.  This was previously evaluated by ENT, with findings of GERD.  She was seen in the office  and prescribed pantoprazole, which never started taking.  She did get the gallbladder ultrasound which was unremarkable.  She has ongoing intermittent right upper quadrant pain that she feels is slightly worse with gluten intake.  She had a negative celiac panel and negative H. pylori.  Negative CSID.  During her previous office evaluation she denied any obvious reflux symptoms and was not interested in starting a PPI.  She is chronically constipated and does not use any medication for this.  She was recommended to initiate MiraLAX, with plans to try Trulance if no improvement. She briefly took Miralax and noted no improvement.     Past Medical History:   Past Medical History:   Diagnosis Date    Anxiety     Depression     GERD (gastroesophageal reflux disease)     Mental disorder     ANXIETY       Past Surgical History:   Past Surgical History:   Procedure Laterality Date    TONSILLECTOMY      WISDOM TOOTH EXTRACTION         Immunizations:   Immunization History   Administered Date(s) Administered    Tdap 2023        Medications:     Current Outpatient Medications:     acyclovir (Zovirax) 5 % ointment, Apply 1 application  topically to the appropriate area as directed 5 (Five) Times a Day., Disp: 30 g, Rfl: 1    spironolactone (ALDACTONE) 50 MG tablet, 1 tablet Orally twice daily for 90 days, Disp: , Rfl:     Sulfacetamide Sodium-Sulfur 10-5 % liquid, APPLY TOPICALLY TO THE AFFECTED AREA EVERY DAY, Disp: 227 g, Rfl: 0    tretinoin (Retin-A) 0.025 % gel, Apply  topically to the appropriate area as directed Every Night., Disp: 45 g, Rfl: 1     "pantoprazole (PROTONIX) 40 MG EC tablet, Take 1 tablet by mouth Daily. (Patient not taking: Reported on 8/23/2024), Disp: 30 tablet, Rfl: 11    Allergies:   Allergies   Allergen Reactions    Cephalexin Swelling       Family History:   Family History   Problem Relation Age of Onset    Hypertension Mother     Hypertension Maternal Grandmother     Hypertension Maternal Grandfather     Colon cancer Neg Hx        Social History:   Social History     Socioeconomic History    Marital status:    Tobacco Use    Smoking status: Never    Smokeless tobacco: Never   Vaping Use    Vaping status: Never Used   Substance and Sexual Activity    Alcohol use: No    Drug use: No    Sexual activity: Yes         Objective     Vital Signs  /81 (BP Location: Left arm, Patient Position: Sitting, Cuff Size: Adult)   Pulse 87   Temp 97.3 °F (36.3 °C) (Temporal)   Ht 149.9 cm (59\")   Wt 54.6 kg (120 lb 6.4 oz)   BMI 24.32 kg/m²   Estimated body mass index is 24.32 kg/m² as calculated from the following:    Height as of this encounter: 149.9 cm (59\").    Weight as of this encounter: 54.6 kg (120 lb 6.4 oz).    BMI is within normal parameters. No other follow-up for BMI required.      Physical Exam  Vitals reviewed.   Constitutional:       General: She is awake.   Cardiovascular:      Rate and Rhythm: Normal rate.   Pulmonary:      Effort: Pulmonary effort is normal.   Abdominal:      Palpations: Abdomen is soft.      Tenderness: There is abdominal tenderness in the right upper quadrant.   Skin:     General: Skin is warm and dry.   Neurological:      Mental Status: She is alert.          Assessment and Plan     Assessment & Plan  Gastroesophageal reflux disease, unspecified whether esophagitis present  Recommend taking the pantoprazole that was previously prescribed to treat known GERD prior to considering additional procedures/treatments.       Right upper quadrant abdominal pain  In the setting of unremarkable gallbladder US, " I have a high suspicion that her RUQ pain is related to IBS/constipation. She denies being constipated currently but continues to have bloating.     Irritable bowel syndrome with constipation  Miralax was ineffective. Will try Trlance 3 mg daily and continue to aim for 30 g of fiber daily and drink at least 64 ounces of water daily.   KUB to rule out constipation, as this could be contributing to patient's abdominal pain and bloating.                Follow Up  As needed    HAZEL LópezE [facility] [address]  [facility] GASTRO[facility]OLOG[facility]  [address] [place] [id number]

## 2024-08-29 ENCOUNTER — PATIENT MESSAGE (OUTPATIENT)
Dept: FAMILY MEDICINE CLINIC | Facility: CLINIC | Age: 29
End: 2024-08-29
Payer: OTHER GOVERNMENT

## 2024-08-30 ENCOUNTER — TELEPHONE (OUTPATIENT)
Dept: FAMILY MEDICINE CLINIC | Facility: CLINIC | Age: 29
End: 2024-08-30
Payer: OTHER GOVERNMENT

## 2024-08-30 NOTE — TELEPHONE ENCOUNTER
Caller: Lauren Osman    Relationship: Self    Best call back number: [phone]     Caller requesting test results: SELF    What test was performed: ABDOMINAL XRAY    When was the test performed: 08/23/24 FRIDAY    Where was the test performed: [facility]    Additional notes: PLEASE CALL WITH THE NEXT STEP OF THE ABNORMAL RESULTS. MYCHART STATES CALCIFICATIONS OF LEFT KIDNEY. PATIENT HAVING SHARP PAIN THAT COMES AND GOES AND A DULL PAIN ALL THE TIME.

## 2024-09-06 ENCOUNTER — OFFICE VISIT (OUTPATIENT)
Dept: FAMILY MEDICINE CLINIC | Facility: CLINIC | Age: 29
End: 2024-09-06
Payer: OTHER GOVERNMENT

## 2024-09-06 ENCOUNTER — HOSPITAL ENCOUNTER (OUTPATIENT)
Dept: CT IMAGING | Facility: HOSPITAL | Age: 29
Discharge: HOME | End: 2024-09-06
Admitting: STUDENT IN AN ORGANIZED HEALTH CARE EDUCATION/TRAINING PROGRAM
Payer: OTHER GOVERNMENT

## 2024-09-06 VITALS
DIASTOLIC BLOOD PRESSURE: 76 MMHG | OXYGEN SATURATION: 97 % | BODY MASS INDEX: 24.27 KG/M2 | WEIGHT: 120.4 LBS | SYSTOLIC BLOOD PRESSURE: 116 MMHG | HEIGHT: 59 IN | HEART RATE: 78 BPM

## 2024-09-06 DIAGNOSIS — N28.89 RENAL CALCIFICATION: ICD-10-CM

## 2024-09-06 DIAGNOSIS — R10.9 FLANK PAIN: ICD-10-CM

## 2024-09-06 DIAGNOSIS — R10.9 FLANK PAIN: Primary | ICD-10-CM

## 2024-09-06 LAB
BILIRUB BLD-MCNC: NEGATIVE MG/DL
CLARITY, POC: CLEAR
COLOR UR: YELLOW
EXPIRATION DATE: ABNORMAL
GLUCOSE UR STRIP-MCNC: NEGATIVE MG/DL
KETONES UR QL: NEGATIVE
LEUKOCYTE EST, POC: NEGATIVE
Lab: ABNORMAL
NITRITE UR-MCNC: NEGATIVE MG/ML
PH UR: 6 [PH] (ref 5–8)
PROT UR STRIP-MCNC: NEGATIVE MG/DL
RBC # UR STRIP: NEGATIVE /UL
SP GR UR: 1 (ref 1–1.03)
UROBILINOGEN UR QL: NORMAL

## 2024-09-06 PROCEDURE — 99214 OFFICE O/P EST MOD 30 MIN: CPT | Performed by: STUDENT IN AN ORGANIZED HEALTH CARE EDUCATION/TRAINING PROGRAM

## 2024-09-06 PROCEDURE — 81003 URINALYSIS AUTO W/O SCOPE: CPT | Performed by: STUDENT IN AN ORGANIZED HEALTH CARE EDUCATION/TRAINING PROGRAM

## 2024-09-06 PROCEDURE — 74176 CT ABD & PELVIS W/O CONTRAST: CPT

## 2024-09-06 NOTE — PROGRESS NOTES
"  Established Office Visit      Patient Name: Lauren Osman  : 1995   MRN: [id number]   Care Team: Patient Care Team:  Lizbet Velazquez DO as PCP - General (Internal Medicine)    Chief Complaint:    Chief Complaint   Patient presents with    Flank Pain       History of Present Illness: Lauren Osman is a 28 y.o. female who is here today to follow up with flank pain.    Patient reports about 2 months of coming and going right flank pain. Occurring almost daily. Described sometimes as dull, sometimes sharp. No urinary symptoms or changes other than frequency. No family or personal history of nephrolithiasis. No fevers, nausea, vomiting with symptoms. Drinks mostly water, no soda.       Subjective      Review of Systems:   Review of Systems - See HPI    I have reviewed and the following portions of the patient's history were updated as appropriate: past family history, past medical history, past social history, past surgical history and problem list.    Medications:     Current Outpatient Medications:     acyclovir (Zovirax) 5 % ointment, Apply 1 application  topically to the appropriate area as directed 5 (Five) Times a Day., Disp: 30 g, Rfl: 1    pantoprazole (PROTONIX) 40 MG EC tablet, Take 1 tablet by mouth Daily., Disp: 90 tablet, Rfl: 11    Plecanatide (Trulance) 3 MG tablet, Take 1 tablet by mouth Daily., Disp: 30 tablet, Rfl: 11    spironolactone (ALDACTONE) 50 MG tablet, 1 tablet Orally twice daily for 90 days, Disp: , Rfl:     Sulfacetamide Sodium-Sulfur 10-5 % liquid, APPLY TOPICALLY TO THE AFFECTED AREA EVERY DAY, Disp: 227 g, Rfl: 0    tretinoin (Retin-A) 0.025 % gel, Apply  topically to the appropriate area as directed Every Night., Disp: 45 g, Rfl: 1    Allergies:   Allergies   Allergen Reactions    Cephalexin Swelling       Objective     Physical Exam:  Vital Signs:   Vitals:    24 0943   BP: 116/76   Pulse: 78   SpO2: 97%   Weight: 54.6 kg (120 lb 6.4 oz)   Height: 149.9 cm (59\") "     Body mass index is 24.32 kg/m².     Physical Exam  Vitals reviewed.   Constitutional:       Appearance: Normal appearance. She is not ill-appearing.   Cardiovascular:      Rate and Rhythm: Normal rate.   Pulmonary:      Effort: Pulmonary effort is normal. No respiratory distress.   Abdominal:      General: Abdomen is flat. Bowel sounds are normal. There is no distension.      Palpations: Abdomen is soft.      Tenderness: There is no abdominal tenderness. There is no right CVA tenderness, left CVA tenderness or guarding.   Skin:     General: Skin is warm and dry.   Neurological:      Mental Status: She is alert.   Psychiatric:         Mood and Affect: Mood normal.         Behavior: Behavior normal.         Judgment: Judgment normal.         Assessment / Plan      Assessment/Plan:   Problems Addressed This Visit  Diagnoses and all orders for this visit:    1. Flank pain (Primary)  -     POC Urinalysis Dipstick, Automated  -     CT Abdomen Pelvis Stone Protocol; Future    2. Renal calcification  -     CT Abdomen Pelvis Stone Protocol; Future      Physical exam unrevealing today however patient has experienced coming and going flank pain for 2 months now without clear etiology.   UA today - bland  Reviewed XR 8/26/24 - two adjacent linear calcifications concerning for nephrolithiasis. Discussed XR is not the preferred imaging for detecting this. I recommend CT stone protocol and this has been ordered.     Plan of care reviewed with patient at the conclusion of today's visit. Education was provided regarding diagnosis and management.  Patient verbalizes understanding of and agreement with management plan.    Follow Up:   No follow-ups on file.        DO KENIA De Santiago RD  [facility] PRIMARY [facility]  [address]  [facility] [address]  Fax [phone]  Phone [phone]

## 2024-09-20 DIAGNOSIS — L70.0 ACNE VULGARIS: ICD-10-CM

## 2024-09-20 RX ORDER — SULFACETAMIDE SODIUM, SULFUR 100; 50 MG/G; MG/G
EMULSION TOPICAL
Qty: 227 G | Refills: 0 | Status: SHIPPED | OUTPATIENT
Start: 2024-09-20

## 2024-09-30 DIAGNOSIS — L70.0 ACNE VULGARIS: ICD-10-CM

## 2024-09-30 RX ORDER — SULFACETAMIDE SODIUM, SULFUR 100; 50 MG/G; MG/G
EMULSION TOPICAL
Qty: 227 G | Refills: 0 | OUTPATIENT
Start: 2024-09-30

## 2024-10-09 ENCOUNTER — TELEMEDICINE (OUTPATIENT)
Dept: SLEEP MEDICINE | Facility: CLINIC | Age: 29
End: 2024-10-09

## 2024-10-09 VITALS — WEIGHT: 116 LBS | BODY MASS INDEX: 22.78 KG/M2 | HEIGHT: 60 IN

## 2024-10-09 DIAGNOSIS — G47.33 OSA (OBSTRUCTIVE SLEEP APNEA): Primary | ICD-10-CM

## 2024-10-09 PROCEDURE — 99213 OFFICE O/P EST LOW 20 MIN: CPT | Performed by: NURSE PRACTITIONER

## 2024-10-09 NOTE — PROGRESS NOTES
Chief Complaint:   Chief Complaint   Patient presents with    Follow-up       HPI:    Lauren Osman is a 29 y.o. female here for follow-up of sleep apnea.  Patient was last seen 6/6/2023.  Has been tested in the past with in lab study and tested positive for sleep apnea with an AHI of 8.7.  Patient did use CPAP for several months and stopped after about 2 months since you felt her sleep was worse with CPAP.  She did try a MAD as treatment and felt it ruined the bite of her mouth so she stopped wearing this.  She is sleeping 10 hours nightly and still not rested upon awakening.  She goes to sleep within 30 minutes and does get up 1-2 times in the night.  Patient has an Hudson score of 3/24.  We will get her restarted on CPAP and then reassess.        Current medications are:   Current Outpatient Medications:     acyclovir (Zovirax) 5 % ointment, Apply 1 application  topically to the appropriate area as directed 5 (Five) Times a Day., Disp: 30 g, Rfl: 1    pantoprazole (PROTONIX) 40 MG EC tablet, Take 1 tablet by mouth Daily., Disp: 90 tablet, Rfl: 11    Plecanatide (Trulance) 3 MG tablet, Take 1 tablet by mouth Daily., Disp: 30 tablet, Rfl: 11    spironolactone (ALDACTONE) 50 MG tablet, 1 tablet Orally twice daily for 90 days, Disp: , Rfl:     Sulfacetamide Sodium-Sulfur 10-5 % liquid, APPLY TOPICALLY TO THE AFFECTED AREA EVERY DAY, Disp: 227 g, Rfl: 0    tretinoin (Retin-A) 0.025 % gel, Apply  topically to the appropriate area as directed Every Night., Disp: 45 g, Rfl: 1.      The patient's relevant past medical, surgical, family and social history were reviewed and updated in Epic as appropriate.       Review of Systems   Constitutional:  Positive for fatigue.   Respiratory:  Positive for apnea.    Allergic/Immunologic: Positive for environmental allergies.   Psychiatric/Behavioral:  Positive for sleep disturbance. The patient is nervous/anxious.    All other systems reviewed and are  negative.        Objective:    Physical Exam  Constitutional:       Appearance: Normal appearance.   HENT:      Head: Normocephalic and atraumatic.   Pulmonary:      Effort: Pulmonary effort is normal. No respiratory distress.   Neurological:      Mental Status: She is alert and oriented to person, place, and time.   Psychiatric:         Mood and Affect: Mood normal.         Behavior: Behavior normal.         Thought Content: Thought content normal.         Judgment: Judgment normal.           ASSESSMENT/PLAN    Diagnoses and all orders for this visit:    1. YVON (obstructive sleep apnea) (Primary)  -     PAP Therapy        We will get patient restarted with CPAP therapy and see her back 31 to 90 days to reassess her hypersomnia status.  The patient is located in LT[facility], [facility] within [facility] at home. The patient presents today for telehealth service.  This service was conducted via audio/video technology through a secure "[employer] video visit connection through Epic.  This provider is located in [facility].  Patient stated they are in a secure environment for the session.  Patient's condition being diagnosed/treated is appropriate for telemedicine.  The provider identified himself as well as his credentials.  The patient, and/or patient's guardian, consent to be seen remotely, and when consent is given they understanding that the consent allows for patient identifiable information to be sent to a third-party as needed.  They may refuse to be seen remotely at any time.  The electronic data is encrypted and password protected, and the patient and/or guardian has been advised of the potential risk to privacy not withstanding such measures.  Patient identifiers used: Name and date of birth.   I have reviewed the results of my evaluation and impression and discussed my recommendations in detail with the patient.      Signed by  HAZEL Ricketts    October 9, 2024      CC: Lizbet Velazquez DO         No ref. provider  found

## 2024-10-17 ENCOUNTER — OFFICE VISIT (OUTPATIENT)
Dept: FAMILY MEDICINE CLINIC | Facility: CLINIC | Age: 29
End: 2024-10-17
Payer: OTHER GOVERNMENT

## 2024-10-17 VITALS
WEIGHT: 119.4 LBS | BODY MASS INDEX: 23.44 KG/M2 | DIASTOLIC BLOOD PRESSURE: 80 MMHG | HEART RATE: 78 BPM | HEIGHT: 60 IN | OXYGEN SATURATION: 98 % | SYSTOLIC BLOOD PRESSURE: 116 MMHG

## 2024-10-17 DIAGNOSIS — Z91.09 ENVIRONMENTAL ALLERGIES: ICD-10-CM

## 2024-10-17 DIAGNOSIS — Z13.228 SCREENING FOR METABOLIC DISORDER: ICD-10-CM

## 2024-10-17 DIAGNOSIS — Z13.0 SCREENING FOR DEFICIENCY ANEMIA: ICD-10-CM

## 2024-10-17 DIAGNOSIS — K58.1 IRRITABLE BOWEL SYNDROME WITH CONSTIPATION: ICD-10-CM

## 2024-10-17 DIAGNOSIS — Z00.00 ANNUAL PHYSICAL EXAM: Primary | ICD-10-CM

## 2024-10-17 DIAGNOSIS — J30.9 ALLERGIC RHINITIS, UNSPECIFIED SEASONALITY, UNSPECIFIED TRIGGER: ICD-10-CM

## 2024-10-17 DIAGNOSIS — Z13.1 SCREENING FOR DIABETES MELLITUS: ICD-10-CM

## 2024-10-17 DIAGNOSIS — L70.0 ACNE VULGARIS: ICD-10-CM

## 2024-10-17 DIAGNOSIS — Z13.220 SCREENING FOR CHOLESTEROL LEVEL: ICD-10-CM

## 2024-10-17 DIAGNOSIS — Z13.29 SCREENING FOR THYROID DISORDER: ICD-10-CM

## 2024-10-17 PROCEDURE — 99395 PREV VISIT EST AGE 18-39: CPT | Performed by: STUDENT IN AN ORGANIZED HEALTH CARE EDUCATION/TRAINING PROGRAM

## 2024-10-17 RX ORDER — SULFACETAMIDE SODIUM, SULFUR 100; 50 MG/G; MG/G
1 EMULSION TOPICAL DAILY
Qty: 227 G | Refills: 2 | Status: SHIPPED | OUTPATIENT
Start: 2024-10-17

## 2024-10-17 RX ORDER — CLINDAMYCIN AND BENZOYL PEROXIDE 10; 50 MG/G; MG/G
1 GEL TOPICAL 2 TIMES DAILY
Qty: 50 G | Refills: 2 | Status: SHIPPED | OUTPATIENT
Start: 2024-10-17

## 2024-10-17 NOTE — Clinical Note
Please request records from [facility] - Dr Álvarez, pap records in 2024

## 2024-10-17 NOTE — PROGRESS NOTES
Physical Exam     Patient Name: Lauren Osman  : 1995   MRN: [id number]   Care Team: Patient Care Team:  Lizbet Velazquez DO as PCP - General (Internal Medicine)    Chief Complaint:    Chief Complaint   Patient presents with   • Annual Exam       History of Present Illness: Lauren Osman is a 29 y.o. female with YVON, GERD IBS-C who is presents today for annual healthcare maintenance.     She is interested in following with allergist. She had referral 2 years ago but did not follow up with this and missed calls. She reports several environmental allergies which she would like to be screened for such as grass, pollen. Has sensitive skin and sometimes breaks out into rash (recently broke out after using shampoo). She struggles with runny nose, congestion seasonally.     Health Maintenance   Topic Date Due   • PAP SMEAR  Never done   • INFLUENZA VACCINE  2025 (Originally 2024)   • HEPATITIS C SCREENING  10/17/2025 (Originally 2019)   • ANNUAL PHYSICAL  10/17/2025   • TDAP/TD VACCINES (2 - Td or Tdap) 2033   • Pneumococcal Vaccine 0-64  Aged Out   • COVID-19 Vaccine  Discontinued       Subjective      Review of Systems:   Review of Systems - See HPI    Past Medical History:   Past Medical History:   Diagnosis Date   • Anxiety    • Depression    • GERD (gastroesophageal reflux disease)    • Mental disorder     ANXIETY       Past Surgical History:   Past Surgical History:   Procedure Laterality Date   • TONSILLECTOMY     • WISDOM TOOTH EXTRACTION         Family History:   Family History   Problem Relation Age of Onset   • Hypertension Mother    • Hypertension Maternal Grandmother    • Hypertension Maternal Grandfather    • Colon cancer Neg Hx        Social History:   Social History     Socioeconomic History   • Marital status:    Tobacco Use   • Smoking status: Never   • Smokeless tobacco: Never   Vaping Use   • Vaping status: Never Used   Substance and Sexual Activity   •  "Alcohol use: No   • Drug use: Never   • Sexual activity: Yes     Partners: Male     Birth control/protection: Condom       Tobacco History:   Social History     Tobacco Use   Smoking Status Never   Smokeless Tobacco Never       Medications:     Current Outpatient Medications:   •  acyclovir (Zovirax) 5 % ointment, Apply 1 application  topically to the appropriate area as directed 5 (Five) Times a Day., Disp: 30 g, Rfl: 1  •  Sulfacetamide Sodium-Sulfur 10-5 % liquid, Apply 1 Application topically to the appropriate area as directed Daily., Disp: 227 g, Rfl: 2  •  tretinoin (Retin-A) 0.025 % gel, Apply  topically to the appropriate area as directed Every Night., Disp: 45 g, Rfl: 1  •  clindamycin-benzoyl peroxide (BenzaClin) 1-5 % gel, Apply 1 Application topically to the appropriate area as directed 2 (Two) Times a Day., Disp: 50 g, Rfl: 2    Allergies:   Allergies   Allergen Reactions   • Cephalexin Swelling       Past Medical History, Social History, Family History and Care Team were all reviewed with patient and updated as appropriate.       Objective     Physical Exam:  Vital Signs:   Vitals:    10/17/24 1240   BP: 116/80   Pulse: 78   SpO2: 98%   Weight: 54.2 kg (119 lb 6.4 oz)   Height: 152.4 cm (60\")     Body mass index is 23.32 kg/m².     Physical Exam  Vitals reviewed.   Constitutional:       Appearance: Normal appearance. She is not ill-appearing.   Cardiovascular:      Rate and Rhythm: Normal rate and regular rhythm.      Heart sounds: No murmur heard.  Pulmonary:      Effort: Pulmonary effort is normal. No respiratory distress.      Breath sounds: Normal breath sounds. No wheezing.   Abdominal:      General: Abdomen is flat. There is no distension.      Palpations: Abdomen is soft.      Tenderness: There is no abdominal tenderness.   Skin:     General: Skin is warm and dry.      Comments: Few scattered pustules/papules throughout bilateral cheeks    Neurological:      Mental Status: She is alert. "   Psychiatric:         Mood and Affect: Mood normal.         Behavior: Behavior normal.         Judgment: Judgment normal.         Assessment / Plan      Assessment/Plan:   Problems Addressed This Visit  Diagnoses and all orders for this visit:    1. Annual physical exam (Primary)  -     CBC w AUTO Differential; Future  -     Comprehensive metabolic panel; Future  -     Vitamin D 25 hydroxy; Future  -     T3; Future  -     TSH Rfx On Abnormal To Free T4; Future  -     Thyroid Peroxidase Antibody; Future  -     Anti-Thyroglobulin Antibody; Future  -     Lactate Dehydrogenase; Future  -     High Sensitivity CRP; Future  -     Sedimentation rate, automated; Future  -     Hemoglobin A1c; Future  -     Insulin, Random; Future  -     Iron Profile; Future  -     Ferritin; Future  -     Homocysteine; Future  -     Lipid Panel w/ Chol/HDL Ratio; Future    2. Acne vulgaris  -     Sulfacetamide Sodium-Sulfur 10-5 % liquid; Apply 1 Application topically to the appropriate area as directed Daily.  Dispense: 227 g; Refill: 2  -     clindamycin-benzoyl peroxide (BenzaClin) 1-5 % gel; Apply 1 Application topically to the appropriate area as directed 2 (Two) Times a Day.  Dispense: 50 g; Refill: 2    3. Screening for metabolic disorder  -     CBC w AUTO Differential; Future  -     Comprehensive metabolic panel; Future  -     Vitamin D 25 hydroxy; Future  -     T3; Future  -     TSH Rfx On Abnormal To Free T4; Future  -     Thyroid Peroxidase Antibody; Future  -     Anti-Thyroglobulin Antibody; Future  -     Lactate Dehydrogenase; Future  -     High Sensitivity CRP; Future  -     Sedimentation rate, automated; Future  -     Hemoglobin A1c; Future  -     Insulin, Random; Future  -     Iron Profile; Future  -     Ferritin; Future  -     Homocysteine; Future  -     Lipid Panel w/ Chol/HDL Ratio; Future    4. Screening for deficiency anemia  -     CBC w AUTO Differential; Future  -     Comprehensive metabolic panel; Future  -     Vitamin  D 25 hydroxy; Future  -     T3; Future  -     TSH Rfx On Abnormal To Free T4; Future  -     Thyroid Peroxidase Antibody; Future  -     Anti-Thyroglobulin Antibody; Future  -     Lactate Dehydrogenase; Future  -     High Sensitivity CRP; Future  -     Sedimentation rate, automated; Future  -     Hemoglobin A1c; Future  -     Insulin, Random; Future  -     Iron Profile; Future  -     Ferritin; Future  -     Homocysteine; Future  -     Lipid Panel w/ Chol/HDL Ratio; Future    5. Screening for thyroid disorder  -     CBC w AUTO Differential; Future  -     Comprehensive metabolic panel; Future  -     Vitamin D 25 hydroxy; Future  -     T3; Future  -     TSH Rfx On Abnormal To Free T4; Future  -     Thyroid Peroxidase Antibody; Future  -     Anti-Thyroglobulin Antibody; Future  -     Lactate Dehydrogenase; Future  -     High Sensitivity CRP; Future  -     Sedimentation rate, automated; Future  -     Hemoglobin A1c; Future  -     Insulin, Random; Future  -     Iron Profile; Future  -     Ferritin; Future  -     Homocysteine; Future  -     Lipid Panel w/ Chol/HDL Ratio; Future    6. Screening for diabetes mellitus  -     CBC w AUTO Differential; Future  -     Comprehensive metabolic panel; Future  -     Vitamin D 25 hydroxy; Future  -     T3; Future  -     TSH Rfx On Abnormal To Free T4; Future  -     Thyroid Peroxidase Antibody; Future  -     Anti-Thyroglobulin Antibody; Future  -     Lactate Dehydrogenase; Future  -     High Sensitivity CRP; Future  -     Sedimentation rate, automated; Future  -     Hemoglobin A1c; Future  -     Insulin, Random; Future  -     Iron Profile; Future  -     Ferritin; Future  -     Homocysteine; Future  -     Lipid Panel w/ Chol/HDL Ratio; Future    7. Screening for cholesterol level  -     Lipid Panel w/ Chol/HDL Ratio; Future    8. Irritable bowel syndrome with constipation  -     CBC w AUTO Differential; Future  -     Comprehensive metabolic panel; Future  -     Vitamin D 25 hydroxy;  Future  -     T3; Future  -     TSH Rfx On Abnormal To Free T4; Future  -     Thyroid Peroxidase Antibody; Future  -     Anti-Thyroglobulin Antibody; Future  -     Lactate Dehydrogenase; Future  -     High Sensitivity CRP; Future  -     Sedimentation rate, automated; Future  -     Hemoglobin A1c; Future  -     Insulin, Random; Future  -     Iron Profile; Future  -     Ferritin; Future  -     Homocysteine; Future  -     Lipid Panel w/ Chol/HDL Ratio; Future    9. Environmental allergies  -     Ambulatory Referral to Allergy    10. Allergic rhinitis, unspecified seasonality, unspecified trigger  -     Ambulatory Referral to Allergy        Healthcare Maintenance   Vaccines:  Influenza and covid declined     Cancer Screening  -Pap smear 2024 - following with Dr. Álvarez, [facility] - records requested     Other  -PHQ score 0  -Counseled on importance of maintaining healthy diet and routine exercise. Encouraged 150 min exercise per week.  -Tobacco cessation: nonsmoker  -Sexual Health: Counseled patient on importance of safe sex habits including various methods of birth control to prevent unexpected pregnancies and barrier contraception to prevent STD transmission.  -Blood pressure is at goal <130/80  -Metabolic screening today    Encouraged routine eye and dental exams.     GERD - well controlled with dietary modifications   IBS-C - following with GI. No longer needing trulance   Acne Vulgaris - continue benzaclin, sulfacetamide wash, and tretinoin prn    She follows with functional medicine provide who has requested labs drawn - she brings list of labs today - agreeable to ordering these and she will follow up with her provider.     Plan of care reviewed with patient at the conclusion of today's visit. Education was provided regarding diagnosis and management.  Patient verbalizes understanding of and agreement with management plan.    Follow Up:   Return in about 1 year (around 10/17/2025) for Annual  physical.        DO KENIA De Santiago RD  [facility]  [address]  [facility] [address]  Fax [phone]  Phone [phone]

## 2024-10-18 ENCOUNTER — LAB (OUTPATIENT)
Dept: LAB | Facility: HOSPITAL | Age: 29
End: 2024-10-18
Payer: OTHER GOVERNMENT

## 2024-10-18 DIAGNOSIS — Z13.29 SCREENING FOR THYROID DISORDER: ICD-10-CM

## 2024-10-18 DIAGNOSIS — K58.1 IRRITABLE BOWEL SYNDROME WITH CONSTIPATION: ICD-10-CM

## 2024-10-18 DIAGNOSIS — Z13.1 SCREENING FOR DIABETES MELLITUS: ICD-10-CM

## 2024-10-18 DIAGNOSIS — Z13.228 SCREENING FOR METABOLIC DISORDER: ICD-10-CM

## 2024-10-18 DIAGNOSIS — Z00.00 ANNUAL PHYSICAL EXAM: ICD-10-CM

## 2024-10-18 DIAGNOSIS — Z13.220 SCREENING FOR CHOLESTEROL LEVEL: ICD-10-CM

## 2024-10-18 DIAGNOSIS — Z13.0 SCREENING FOR DEFICIENCY ANEMIA: ICD-10-CM

## 2024-10-18 LAB
BASOPHILS # BLD AUTO: 0.02 10*3/MM3 (ref 0–0.2)
BASOPHILS NFR BLD AUTO: 0.4 % (ref 0–1.5)
DEPRECATED RDW RBC AUTO: 40.5 FL (ref 37–54)
EOSINOPHIL # BLD AUTO: 0.1 10*3/MM3 (ref 0–0.4)
EOSINOPHIL NFR BLD AUTO: 2.2 % (ref 0.3–6.2)
ERYTHROCYTE [DISTWIDTH] IN BLOOD BY AUTOMATED COUNT: 11.8 % (ref 12.3–15.4)
HCT VFR BLD AUTO: 43.9 % (ref 34–46.6)
HGB BLD-MCNC: 15.1 G/DL (ref 12–15.9)
IMM GRANULOCYTES # BLD AUTO: 0.01 10*3/MM3 (ref 0–0.05)
IMM GRANULOCYTES NFR BLD AUTO: 0.2 % (ref 0–0.5)
LYMPHOCYTES # BLD AUTO: 1.84 10*3/MM3 (ref 0.7–3.1)
LYMPHOCYTES NFR BLD AUTO: 40.3 % (ref 19.6–45.3)
MCH RBC QN AUTO: 32.7 PG (ref 26.6–33)
MCHC RBC AUTO-ENTMCNC: 34.4 G/DL (ref 31.5–35.7)
MCV RBC AUTO: 95 FL (ref 79–97)
MONOCYTES # BLD AUTO: 0.25 10*3/MM3 (ref 0.1–0.9)
MONOCYTES NFR BLD AUTO: 5.5 % (ref 5–12)
NEUTROPHILS NFR BLD AUTO: 2.35 10*3/MM3 (ref 1.7–7)
NEUTROPHILS NFR BLD AUTO: 51.4 % (ref 42.7–76)
NRBC BLD AUTO-RTO: 0 /100 WBC (ref 0–0.2)
PLATELET # BLD AUTO: 200 10*3/MM3 (ref 140–450)
PMV BLD AUTO: 11.5 FL (ref 6–12)
RBC # BLD AUTO: 4.62 10*6/MM3 (ref 3.77–5.28)
WBC NRBC COR # BLD AUTO: 4.57 10*3/MM3 (ref 3.4–10.8)

## 2024-10-18 PROCEDURE — 83615 LACTATE (LD) (LDH) ENZYME: CPT

## 2024-10-18 PROCEDURE — 86141 C-REACTIVE PROTEIN HS: CPT

## 2024-10-18 PROCEDURE — 85652 RBC SED RATE AUTOMATED: CPT

## 2024-10-18 PROCEDURE — 86376 MICROSOMAL ANTIBODY EACH: CPT

## 2024-10-18 PROCEDURE — 84443 ASSAY THYROID STIM HORMONE: CPT

## 2024-10-18 PROCEDURE — 83540 ASSAY OF IRON: CPT

## 2024-10-18 PROCEDURE — 83525 ASSAY OF INSULIN: CPT

## 2024-10-18 PROCEDURE — 80061 LIPID PANEL: CPT

## 2024-10-18 PROCEDURE — 83036 HEMOGLOBIN GLYCOSYLATED A1C: CPT

## 2024-10-18 PROCEDURE — 84480 ASSAY TRIIODOTHYRONINE (T3): CPT

## 2024-10-18 PROCEDURE — 82306 VITAMIN D 25 HYDROXY: CPT

## 2024-10-18 PROCEDURE — 82728 ASSAY OF FERRITIN: CPT

## 2024-10-18 PROCEDURE — 84466 ASSAY OF TRANSFERRIN: CPT

## 2024-10-18 PROCEDURE — 83090 ASSAY OF HOMOCYSTEINE: CPT

## 2024-10-18 PROCEDURE — 86800 THYROGLOBULIN ANTIBODY: CPT

## 2024-10-18 PROCEDURE — 80053 COMPREHEN METABOLIC PANEL: CPT

## 2024-10-18 PROCEDURE — 85025 COMPLETE CBC W/AUTO DIFF WBC: CPT

## 2024-10-19 LAB
25(OH)D3 SERPL-MCNC: 34.9 NG/ML (ref 30–100)
ALBUMIN SERPL-MCNC: 4.4 G/DL (ref 3.5–5.2)
ALBUMIN/GLOB SERPL: 1.9 G/DL
ALP SERPL-CCNC: 48 U/L (ref 39–117)
ALT SERPL W P-5'-P-CCNC: 13 U/L (ref 1–33)
ANION GAP SERPL CALCULATED.3IONS-SCNC: 9.9 MMOL/L (ref 5–15)
AST SERPL-CCNC: 19 U/L (ref 1–32)
BILIRUB SERPL-MCNC: 0.4 MG/DL (ref 0–1.2)
BUN SERPL-MCNC: 11 MG/DL (ref 6–20)
BUN/CREAT SERPL: 16.7 (ref 7–25)
CALCIUM SPEC-SCNC: 9.2 MG/DL (ref 8.6–10.5)
CHLORIDE SERPL-SCNC: 106 MMOL/L (ref 98–107)
CHOLEST SERPL-MCNC: 178 MG/DL (ref 0–200)
CO2 SERPL-SCNC: 23.1 MMOL/L (ref 22–29)
CREAT SERPL-MCNC: 0.66 MG/DL (ref 0.57–1)
CRP SERPL-MCNC: 0.07 MG/DL (ref 0.01–0.5)
EGFRCR SERPLBLD CKD-EPI 2021: 122.7 ML/MIN/1.73
ERYTHROCYTE [SEDIMENTATION RATE] IN BLOOD: 3 MM/HR (ref 0–20)
FERRITIN SERPL-MCNC: 58.6 NG/ML (ref 13–150)
GLOBULIN UR ELPH-MCNC: 2.3 GM/DL
GLUCOSE SERPL-MCNC: 81 MG/DL (ref 65–99)
HBA1C MFR BLD: 4.8 % (ref 4.8–5.6)
HCYS SERPL-MCNC: 6.9 UMOL/L (ref 0–15)
HDLC SERPL QL: 2.7
HDLC SERPL-MCNC: 66 MG/DL (ref 40–60)
IRON 24H UR-MRATE: 107 MCG/DL (ref 37–145)
IRON SATN MFR SERPL: 31 % (ref 20–50)
LDH SERPL-CCNC: 205 U/L (ref 135–214)
LDLC SERPL CALC-MCNC: 101 MG/DL (ref 0–100)
POTASSIUM SERPL-SCNC: 3.8 MMOL/L (ref 3.5–5.2)
PROT SERPL-MCNC: 6.7 G/DL (ref 6–8.5)
SODIUM SERPL-SCNC: 139 MMOL/L (ref 136–145)
T3 SERPL-MCNC: 123 NG/DL (ref 80–200)
TIBC SERPL-MCNC: 341 MCG/DL (ref 298–536)
TRANSFERRIN SERPL-MCNC: 229 MG/DL (ref 200–360)
TRIGL SERPL-MCNC: 59 MG/DL (ref 0–150)
TSH SERPL DL<=0.05 MIU/L-ACNC: 1.44 UIU/ML (ref 0.27–4.2)
VLDLC SERPL-MCNC: 11 MG/DL (ref 5–40)

## 2024-10-20 LAB — THYROPEROXIDASE AB SERPL-ACNC: 12 IU/ML (ref 0–34)

## 2024-10-21 LAB — THYROGLOB AB SERPL-ACNC: <1 IU/ML (ref 0–0.9)

## 2024-10-25 ENCOUNTER — PATIENT ROUNDING (BHMG ONLY) (OUTPATIENT)
Dept: URGENT CARE | Facility: CLINIC | Age: 29
End: 2024-10-25
Payer: OTHER GOVERNMENT

## 2024-10-25 NOTE — ED NOTES
Thank you for letting us care for you in your recent visit to our urgent care center. We would love to hear about your experience with us. Was this the first time you have visited our location?    We’re always looking for ways to make our patients’ experiences even better. Do you have any recommendations on ways we may improve?     I appreciate you taking the time to respond. Please be on the lookout for a survey about your recent visit from [facility] via text or email. We would greatly appreciate if you could fill that out and turn it back in. We want your voice to be heard and we value your feedback.   Thank you for choosing [facility] for your healthcare needs.

## 2024-10-28 LAB — INSULIN SERPL-ACNC: 4.7 UIU/ML

## 2025-01-02 ENCOUNTER — TELEPHONE (OUTPATIENT)
Dept: SLEEP MEDICINE | Facility: CLINIC | Age: 30
End: 2025-01-02
Payer: OTHER GOVERNMENT

## 2025-01-02 NOTE — TELEPHONE ENCOUNTER
Spoke with [facility] medical regarding new pap setup, staff stated patients  told them patient does not want new machine and to not bring the machine to her. [facility] then voided order. 01/02/25 AB

## 2025-02-10 ENCOUNTER — OFFICE VISIT (OUTPATIENT)
Dept: FAMILY MEDICINE CLINIC | Facility: CLINIC | Age: 30
End: 2025-02-10
Payer: OTHER GOVERNMENT

## 2025-02-10 VITALS
TEMPERATURE: 98.1 F | BODY MASS INDEX: 22.42 KG/M2 | DIASTOLIC BLOOD PRESSURE: 70 MMHG | HEIGHT: 60 IN | HEART RATE: 85 BPM | SYSTOLIC BLOOD PRESSURE: 114 MMHG | OXYGEN SATURATION: 98 % | WEIGHT: 114.2 LBS

## 2025-02-10 DIAGNOSIS — J02.9 SORE THROAT: Primary | ICD-10-CM

## 2025-02-10 PROCEDURE — 87081 CULTURE SCREEN ONLY: CPT

## 2025-02-10 PROCEDURE — 87428 SARSCOV & INF VIR A&B AG IA: CPT

## 2025-02-10 PROCEDURE — 87880 STREP A ASSAY W/OPTIC: CPT

## 2025-02-10 PROCEDURE — 99214 OFFICE O/P EST MOD 30 MIN: CPT

## 2025-02-10 RX ORDER — AZELASTINE 1 MG/ML
SPRAY, METERED NASAL
COMMUNITY
Start: 2025-01-17

## 2025-02-10 NOTE — PROGRESS NOTES
Office Note     Name: Lauren Osman    : 1995     MRN: [id number]     Chief Complaint  Sore Throat    Subjective     History of Present Illness:  Lauren Osman is a 29 y.o. female who presents today for sore throat for 1 day.  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  Patient states yesterday she began having a sore throat.  She states she has had some bodyaches.  Denies neck pain or rigidity.  States some of her back hurts, but denies numbness or tingling or difficulty urinating or dysuria.  She has not tried any medication at home for it.  Denies ear pain, but does have some nasal congestion.  Denies cough, shortness of breath or chest pain.  Denies abdominal pain, nausea or vomiting.  Denies difficulty swallowing or speaking.  Denies fevers or chills or headache.      Review of Systems:   Review of Systems   Constitutional:  Negative for chills and fever.   Respiratory:  Negative for chest tightness and shortness of breath.    Cardiovascular:  Negative for chest pain and palpitations.   Gastrointestinal:  Negative for abdominal pain.   Neurological:  Negative for dizziness, light-headedness and headache.       Past Medical History:   Past Medical History:   Diagnosis Date    Anxiety     Depression     GERD (gastroesophageal reflux disease)     Mental disorder     ANXIETY       Past Surgical History:   Past Surgical History:   Procedure Laterality Date    TONSILLECTOMY  2020    WISDOM TOOTH EXTRACTION         Family History:   Family History   Problem Relation Age of Onset    Hypertension Mother     Hypertension Maternal Grandmother     Hypertension Maternal Grandfather     Colon cancer Neg Hx        Social History:   Social History     Socioeconomic History    Marital status:    Tobacco Use    Smoking status: Never     Passive exposure: Never    Smokeless tobacco: Never   Vaping Use    Vaping status: Never Used   Substance and Sexual Activity     "Alcohol use: No    Drug use: Never    Sexual activity: Yes     Partners: Male     Birth control/protection: Condom       Immunizations:   Immunization History   Administered Date(s) Administered    Tdap 07/13/2023        Medications:     Current Outpatient Medications:     acyclovir (Zovirax) 5 % ointment, Apply 1 application  topically to the appropriate area as directed 5 (Five) Times a Day., Disp: 30 g, Rfl: 1    azelastine (ASTELIN) 0.1 % nasal spray, INSTILL 1-2 SPRAYS IN EACH NOSTRIL TWICE DAILY AS NEEDED, Disp: , Rfl:     clindamycin-benzoyl peroxide (BenzaClin) 1-5 % gel, Apply 1 Application topically to the appropriate area as directed 2 (Two) Times a Day., Disp: 50 g, Rfl: 2    Sulfacetamide Sodium-Sulfur 10-5 % liquid, Apply 1 Application topically to the appropriate area as directed Daily., Disp: 227 g, Rfl: 2    tretinoin (Retin-A) 0.025 % gel, Apply  topically to the appropriate area as directed Every Night., Disp: 45 g, Rfl: 1    Allergies:   Allergies   Allergen Reactions    Cephalexin Swelling       Objective     Vital Signs  /70 (BP Location: Right arm, Patient Position: Sitting, Cuff Size: Adult)   Pulse 85   Temp 98.1 °F (36.7 °C) (Oral)   Ht 152.4 cm (60\")   Wt 51.8 kg (114 lb 3.2 oz)   SpO2 98%   BMI 22.30 kg/m²   Estimated body mass index is 22.3 kg/m² as calculated from the following:    Height as of this encounter: 152.4 cm (60\").    Weight as of this encounter: 51.8 kg (114 lb 3.2 oz).    BMI is within normal parameters. No other follow-up for BMI required.       Physical Exam  Vitals reviewed.   Constitutional:       General: She is not in acute distress.     Appearance: Normal appearance. She is not toxic-appearing.   HENT:      Head: Normocephalic and atraumatic.      Right Ear: Tympanic membrane and ear canal normal.      Left Ear: Tympanic membrane and ear canal normal.      Nose: Congestion present. No rhinorrhea.      Mouth/Throat:      Mouth: Mucous membranes are moist. "      Pharynx: Oropharynx is clear. Uvula midline. Posterior oropharyngeal erythema present. No pharyngeal swelling, oropharyngeal exudate, uvula swelling or postnasal drip.      Comments: No tonsils present.   Eyes:      Pupils: Pupils are equal, round, and reactive to light.   Cardiovascular:      Rate and Rhythm: Normal rate and regular rhythm.      Pulses: Normal pulses.      Heart sounds: Normal heart sounds.   Pulmonary:      Effort: Pulmonary effort is normal. No respiratory distress.      Breath sounds: Normal breath sounds. No wheezing, rhonchi or rales.   Musculoskeletal:      Cervical back: Normal range of motion. No rigidity or tenderness. No pain with movement.   Lymphadenopathy:      Cervical: Cervical adenopathy present.   Skin:     General: Skin is warm.   Neurological:      General: No focal deficit present.      Mental Status: She is alert and oriented to person, place, and time.   Psychiatric:         Mood and Affect: Mood normal.         Results:  Recent Results (from the past 24 hours)   POCT SARS-CoV-2 + Flu Antigen LOREN    Collection Time: 02/11/25  7:53 AM    Specimen: Swab   Result Value Ref Range    SARS Antigen Not Detected Not Detected, Presumptive Negative    Influenza A Antigen LOREN Not Detected Not Detected    Influenza B Antigen LOREN Not Detected Not Detected    Internal Control Passed Passed    Lot Number [id number]     Expiration Date 11.21.2025    POC Rapid Strep A    Collection Time: 02/11/25  7:54 AM    Specimen: Swab   Result Value Ref Range    Rapid Strep A Screen Negative Negative, VALID, INVALID, Not Performed    Internal Control Passed Passed    Lot Number [id number]     Expiration Date 03.06.2027         Assessment and Plan     Assessment/Plan:  Diagnoses and all orders for this visit:    1. Sore throat (Primary)  -     POCT SARS-CoV-2 + Flu Antigen LOREN  -     POC Rapid Strep A  -     Cancel: Beta Strep Culture, Throat - , Throat; Future  -     Beta Strep Culture, Throat - Swab,  Throat; Future  -     Beta Strep Culture, Throat - Swab, Throat    Negative for covid, strep and flu today.   Will treat symptomatically at this time.  Recommend use of ibuprofen and Tylenol.  May alternate these every 6-8 hours.  Will send off a strep culture due to exposure to kids frequently.  Recommend to drink warm fluids and increase overall hydration.  I did advise that if her symptoms are not improving or acutely worsening to return to clinic for reevaluation.  She verbalized understanding and agreed to plan.    Plan of care reviewed with the patient at the conclusion of today's visit.  Education was provided regarding diagnosis and management.  Patient verbalizes understanding of and agreement with plan.       Follow Up  Return if symptoms worsen or fail to improve.    Carmina Stein PA-C   [facility]

## 2025-02-11 LAB
EXPIRATION DATE: NORMAL
EXPIRATION DATE: NORMAL
FLUAV AG UPPER RESP QL IA.RAPID: NOT DETECTED
FLUBV AG UPPER RESP QL IA.RAPID: NOT DETECTED
INTERNAL CONTROL: NORMAL
INTERNAL CONTROL: NORMAL
Lab: NORMAL
Lab: NORMAL
S PYO AG THROAT QL: NEGATIVE
SARS-COV-2 AG UPPER RESP QL IA.RAPID: NOT DETECTED

## 2025-02-12 LAB — BACTERIA SPEC AEROBE CULT: NORMAL

## 2025-08-19 ENCOUNTER — HOSPITAL ENCOUNTER (OUTPATIENT)
Dept: GENERAL RADIOLOGY | Facility: HOSPITAL | Age: 30
Discharge: HOME | End: 2025-08-19
Admitting: FAMILY MEDICINE
Payer: OTHER GOVERNMENT

## 2025-08-19 DIAGNOSIS — S30.0XXA COCCYX CONTUSION, INITIAL ENCOUNTER: ICD-10-CM

## 2025-08-19 PROCEDURE — 72220 X-RAY EXAM SACRUM TAILBONE: CPT
